# Patient Record
Sex: FEMALE | Race: WHITE | Employment: UNEMPLOYED | URBAN - METROPOLITAN AREA
[De-identification: names, ages, dates, MRNs, and addresses within clinical notes are randomized per-mention and may not be internally consistent; named-entity substitution may affect disease eponyms.]

---

## 2021-04-07 ENCOUNTER — HOSPITAL ENCOUNTER (INPATIENT)
Age: 46
LOS: 6 days | Discharge: HOME | DRG: 885 | End: 2021-04-13
Attending: EMERGENCY MEDICINE | Admitting: PSYCHIATRY & NEUROLOGY
Payer: MEDICARE

## 2021-04-07 DIAGNOSIS — R45.850 HOMICIDAL IDEATION: ICD-10-CM

## 2021-04-07 DIAGNOSIS — G89.4 CHRONIC PAIN SYNDROME: ICD-10-CM

## 2021-04-07 DIAGNOSIS — F10.20 ALCOHOL USE DISORDER, SEVERE, DEPENDENCE (HCC): ICD-10-CM

## 2021-04-07 DIAGNOSIS — F31.63 BIPOLAR DISORDER, CURR EPISODE MIXED, SEVERE, W/O PSYCHOTIC FEATURES (HCC): ICD-10-CM

## 2021-04-07 DIAGNOSIS — F10.920 ALCOHOLIC INTOXICATION WITHOUT COMPLICATION (HCC): Primary | ICD-10-CM

## 2021-04-07 DIAGNOSIS — F10.24: ICD-10-CM

## 2021-04-07 PROBLEM — F31.10 BIPOLAR DISEASE, MANIC (HCC): Status: ACTIVE | Noted: 2021-04-07

## 2021-04-07 LAB
AMPHET UR QL SCN: NEGATIVE
BARBITURATES UR QL SCN: NEGATIVE
BENZODIAZ UR QL: NEGATIVE
CANNABINOIDS UR QL SCN: POSITIVE
COCAINE UR QL SCN: NEGATIVE
COMMENT, HOLDF: NORMAL
DRUG SCRN COMMENT,DRGCM: ABNORMAL
ETHANOL SERPL-MCNC: 110 MG/DL
ETHANOL SERPL-MCNC: 311 MG/DL
FLUAV RNA SPEC QL NAA+PROBE: NOT DETECTED
FLUBV RNA SPEC QL NAA+PROBE: NOT DETECTED
HCG UR QL: NEGATIVE
METHADONE UR QL: NEGATIVE
OPIATES UR QL: NEGATIVE
PCP UR QL: NEGATIVE
SAMPLES BEING HELD,HOLD: NORMAL
SARS-COV-2, COV2: NOT DETECTED

## 2021-04-07 PROCEDURE — 82077 ASSAY SPEC XCP UR&BREATH IA: CPT

## 2021-04-07 PROCEDURE — 87636 SARSCOV2 & INF A&B AMP PRB: CPT

## 2021-04-07 PROCEDURE — 99285 EMERGENCY DEPT VISIT HI MDM: CPT

## 2021-04-07 PROCEDURE — 81025 URINE PREGNANCY TEST: CPT

## 2021-04-07 PROCEDURE — 65220000003 HC RM SEMIPRIVATE PSYCH

## 2021-04-07 PROCEDURE — 80307 DRUG TEST PRSMV CHEM ANLYZR: CPT

## 2021-04-07 PROCEDURE — 36415 COLL VENOUS BLD VENIPUNCTURE: CPT

## 2021-04-07 PROCEDURE — 74011250637 HC RX REV CODE- 250/637: Performed by: EMERGENCY MEDICINE

## 2021-04-07 RX ORDER — PHENOBARBITAL 32.4 MG/1
64.8 TABLET ORAL 4 TIMES DAILY
Status: COMPLETED | OUTPATIENT
Start: 2021-04-08 | End: 2021-04-08

## 2021-04-07 RX ORDER — ADHESIVE BANDAGE
30 BANDAGE TOPICAL DAILY PRN
Status: DISCONTINUED | OUTPATIENT
Start: 2021-04-07 | End: 2021-04-13 | Stop reason: HOSPADM

## 2021-04-07 RX ORDER — PHENOBARBITAL 32.4 MG/1
32.4 TABLET ORAL
Status: DISPENSED | OUTPATIENT
Start: 2021-04-08 | End: 2021-04-10

## 2021-04-07 RX ORDER — OLANZAPINE 5 MG/1
5 TABLET ORAL
Status: DISCONTINUED | OUTPATIENT
Start: 2021-04-07 | End: 2021-04-13 | Stop reason: HOSPADM

## 2021-04-07 RX ORDER — PHENOBARBITAL 32.4 MG/1
16.2 TABLET ORAL 2 TIMES DAILY
Status: COMPLETED | OUTPATIENT
Start: 2021-04-11 | End: 2021-04-11

## 2021-04-07 RX ORDER — OXYCODONE HYDROCHLORIDE 5 MG/1
5 TABLET ORAL
Status: COMPLETED | OUTPATIENT
Start: 2021-04-07 | End: 2021-04-07

## 2021-04-07 RX ORDER — PHENOBARBITAL 32.4 MG/1
32.4 TABLET ORAL 2 TIMES DAILY
Status: COMPLETED | OUTPATIENT
Start: 2021-04-10 | End: 2021-04-10

## 2021-04-07 RX ORDER — PHENOBARBITAL 32.4 MG/1
16.2 TABLET ORAL
Status: ACTIVE | OUTPATIENT
Start: 2021-04-10 | End: 2021-04-11

## 2021-04-07 RX ORDER — IBUPROFEN 200 MG
1 TABLET ORAL
Status: COMPLETED | OUTPATIENT
Start: 2021-04-07 | End: 2021-04-08

## 2021-04-07 RX ORDER — LORAZEPAM 2 MG/ML
1 INJECTION INTRAMUSCULAR
Status: DISCONTINUED | OUTPATIENT
Start: 2021-04-07 | End: 2021-04-13 | Stop reason: HOSPADM

## 2021-04-07 RX ORDER — OXYCODONE HYDROCHLORIDE 10 MG/1
7.5 TABLET ORAL
Status: ON HOLD | COMMUNITY
End: 2021-04-08

## 2021-04-07 RX ORDER — ACETAMINOPHEN 325 MG/1
650 TABLET ORAL
Status: DISCONTINUED | OUTPATIENT
Start: 2021-04-07 | End: 2021-04-09

## 2021-04-07 RX ORDER — LORAZEPAM 1 MG/1
1 TABLET ORAL
Status: COMPLETED | OUTPATIENT
Start: 2021-04-07 | End: 2021-04-07

## 2021-04-07 RX ORDER — PHENOBARBITAL 32.4 MG/1
32.4 TABLET ORAL 4 TIMES DAILY
Status: COMPLETED | OUTPATIENT
Start: 2021-04-08 | End: 2021-04-09

## 2021-04-07 RX ORDER — ZIPRASIDONE HYDROCHLORIDE 60 MG/1
60 CAPSULE ORAL 2 TIMES DAILY WITH MEALS
COMMUNITY
End: 2021-04-13

## 2021-04-07 RX ORDER — THERA TABS 400 MCG
1 TAB ORAL DAILY
Status: DISCONTINUED | OUTPATIENT
Start: 2021-04-08 | End: 2021-04-13 | Stop reason: HOSPADM

## 2021-04-07 RX ORDER — HYDROXYZINE 25 MG/1
50 TABLET, FILM COATED ORAL
Status: DISCONTINUED | OUTPATIENT
Start: 2021-04-07 | End: 2021-04-10

## 2021-04-07 RX ORDER — IBUPROFEN 400 MG/1
400 TABLET ORAL
Status: DISCONTINUED | OUTPATIENT
Start: 2021-04-07 | End: 2021-04-13 | Stop reason: HOSPADM

## 2021-04-07 RX ORDER — ACETAMINOPHEN 500 MG
1000 TABLET ORAL
Status: DISCONTINUED | OUTPATIENT
Start: 2021-04-07 | End: 2021-04-07

## 2021-04-07 RX ORDER — IBUPROFEN 200 MG
1 TABLET ORAL DAILY
Status: DISCONTINUED | OUTPATIENT
Start: 2021-04-08 | End: 2021-04-13 | Stop reason: HOSPADM

## 2021-04-07 RX ORDER — DIPHENHYDRAMINE HYDROCHLORIDE 50 MG/ML
50 INJECTION, SOLUTION INTRAMUSCULAR; INTRAVENOUS
Status: DISCONTINUED | OUTPATIENT
Start: 2021-04-07 | End: 2021-04-13 | Stop reason: HOSPADM

## 2021-04-07 RX ORDER — IBUPROFEN 400 MG/1
800 TABLET ORAL
Status: DISCONTINUED | OUTPATIENT
Start: 2021-04-07 | End: 2021-04-07

## 2021-04-07 RX ORDER — HALOPERIDOL 5 MG/ML
5 INJECTION INTRAMUSCULAR
Status: DISCONTINUED | OUTPATIENT
Start: 2021-04-07 | End: 2021-04-13 | Stop reason: HOSPADM

## 2021-04-07 RX ORDER — ZOLPIDEM TARTRATE 10 MG/1
10 TABLET ORAL
COMMUNITY
End: 2021-04-13

## 2021-04-07 RX ORDER — BENZTROPINE MESYLATE 1 MG/1
1 TABLET ORAL
Status: DISCONTINUED | OUTPATIENT
Start: 2021-04-07 | End: 2021-04-13 | Stop reason: HOSPADM

## 2021-04-07 RX ORDER — PHENOBARBITAL 32.4 MG/1
64.8 TABLET ORAL
Status: DISPENSED | OUTPATIENT
Start: 2021-04-07 | End: 2021-04-08

## 2021-04-07 RX ORDER — LEVETIRACETAM 500 MG/1
500 TABLET ORAL 2 TIMES DAILY
Status: DISCONTINUED | OUTPATIENT
Start: 2021-04-08 | End: 2021-04-08

## 2021-04-07 RX ORDER — FOLIC ACID 1 MG/1
1 TABLET ORAL DAILY
Status: DISCONTINUED | OUTPATIENT
Start: 2021-04-08 | End: 2021-04-13 | Stop reason: HOSPADM

## 2021-04-07 RX ORDER — PAROXETINE 30 MG/1
60 TABLET, FILM COATED ORAL DAILY
Status: ON HOLD | COMMUNITY
End: 2021-04-08

## 2021-04-07 RX ORDER — LANOLIN ALCOHOL/MO/W.PET/CERES
100 CREAM (GRAM) TOPICAL DAILY
Status: DISCONTINUED | OUTPATIENT
Start: 2021-04-08 | End: 2021-04-13 | Stop reason: HOSPADM

## 2021-04-07 RX ORDER — TRAZODONE HYDROCHLORIDE 50 MG/1
50 TABLET ORAL
Status: DISCONTINUED | OUTPATIENT
Start: 2021-04-07 | End: 2021-04-13 | Stop reason: HOSPADM

## 2021-04-07 RX ADMIN — LORAZEPAM 1 MG: 1 TABLET ORAL at 18:00

## 2021-04-07 RX ADMIN — OXYCODONE HYDROCHLORIDE 5 MG: 5 TABLET ORAL at 15:50
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
## 2021-04-07 NOTE — ED NOTES
Per ED tech, pt refused COVID swab. Pt stated she has received her COVID vacc and she does not need to be swabbed. Pt given meal tray and hospital cup of water.
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
## 2021-04-07 NOTE — ED NOTES
TRANSFER - OUT REPORT:    Verbal report given to Rosa Phalen, RN(name) on Cat  being transferred to U(unit) for routine progression of care       Report consisted of patients Situation, Background, Assessment and   Recommendations(SBAR). Information from the following report(s) SBAR, ED Summary, [facility] and Recent Results was reviewed with the receiving nurse. Lines:       Opportunity for questions and clarification was provided.       Patient transported with:   security to 3rd floor

## 2021-04-08 PROBLEM — G89.29 CHRONIC PAIN: Status: ACTIVE | Noted: 2021-04-08

## 2021-04-08 PROBLEM — F31.63 BIPOLAR DISORDER, CURR EPISODE MIXED, SEVERE, W/O PSYCHOTIC FEATURES (HCC): Status: ACTIVE | Noted: 2021-04-08

## 2021-04-08 PROBLEM — F10.20 ALCOHOL USE DISORDER, SEVERE, DEPENDENCE (HCC): Status: ACTIVE | Noted: 2021-04-08

## 2021-04-08 PROBLEM — F31.10 BIPOLAR DISEASE, MANIC (HCC): Status: RESOLVED | Noted: 2021-04-07 | Resolved: 2021-04-08

## 2021-04-08 PROBLEM — F10.24: Status: ACTIVE | Noted: 2021-04-08

## 2021-04-08 PROCEDURE — 99223 1ST HOSP IP/OBS HIGH 75: CPT | Performed by: PSYCHIATRY & NEUROLOGY

## 2021-04-08 PROCEDURE — 65220000003 HC RM SEMIPRIVATE PSYCH

## 2021-04-08 PROCEDURE — 74011250637 HC RX REV CODE- 250/637: Performed by: NURSE PRACTITIONER

## 2021-04-08 PROCEDURE — 74011250637 HC RX REV CODE- 250/637: Performed by: PSYCHIATRY & NEUROLOGY

## 2021-04-08 RX ORDER — PANTOPRAZOLE SODIUM 40 MG/1
40 TABLET, DELAYED RELEASE ORAL
Status: DISCONTINUED | OUTPATIENT
Start: 2021-04-08 | End: 2021-04-13 | Stop reason: HOSPADM

## 2021-04-08 RX ORDER — OXYCODONE HYDROCHLORIDE 5 MG/1
7.5 TABLET ORAL
COMMUNITY

## 2021-04-08 RX ORDER — PANTOPRAZOLE SODIUM 40 MG/1
40 TABLET, DELAYED RELEASE ORAL 2 TIMES DAILY
COMMUNITY

## 2021-04-08 RX ORDER — ALBUTEROL SULFATE 90 UG/1
2 AEROSOL, METERED RESPIRATORY (INHALATION)
Status: DISCONTINUED | OUTPATIENT
Start: 2021-04-08 | End: 2021-04-13 | Stop reason: HOSPADM

## 2021-04-08 RX ORDER — PREGABALIN 75 MG/1
75 CAPSULE ORAL 2 TIMES DAILY
Status: DISCONTINUED | OUTPATIENT
Start: 2021-04-08 | End: 2021-04-13 | Stop reason: HOSPADM

## 2021-04-08 RX ORDER — SUCRALFATE 1 G/1
1 TABLET ORAL
COMMUNITY

## 2021-04-08 RX ORDER — LANOLIN ALCOHOL/MO/W.PET/CERES
650 CREAM (GRAM) TOPICAL
Status: DISCONTINUED | OUTPATIENT
Start: 2021-04-09 | End: 2021-04-13 | Stop reason: HOSPADM

## 2021-04-08 RX ORDER — ONDANSETRON 4 MG/1
4 TABLET, ORALLY DISINTEGRATING ORAL
Status: DISCONTINUED | OUTPATIENT
Start: 2021-04-08 | End: 2021-04-13 | Stop reason: HOSPADM

## 2021-04-08 RX ORDER — PAROXETINE HYDROCHLORIDE 40 MG/1
40 TABLET, FILM COATED ORAL DAILY
Status: ON HOLD | COMMUNITY
End: 2021-04-08

## 2021-04-08 RX ORDER — ALBUTEROL SULFATE 90 UG/1
2 AEROSOL, METERED RESPIRATORY (INHALATION)
COMMUNITY

## 2021-04-08 RX ORDER — METOPROLOL TARTRATE 100 MG/1
100 TABLET ORAL EVERY 12 HOURS
COMMUNITY
End: 2021-04-13

## 2021-04-08 RX ORDER — PAROXETINE HYDROCHLORIDE 40 MG/1
40 TABLET, FILM COATED ORAL DAILY
COMMUNITY
End: 2021-04-13

## 2021-04-08 RX ORDER — LANOLIN ALCOHOL/MO/W.PET/CERES
650 CREAM (GRAM) TOPICAL
COMMUNITY

## 2021-04-08 RX ORDER — ONDANSETRON 2 MG/ML
4 INJECTION INTRAMUSCULAR; INTRAVENOUS
Status: DISCONTINUED | OUTPATIENT
Start: 2021-04-08 | End: 2021-04-13 | Stop reason: HOSPADM

## 2021-04-08 RX ORDER — CLONIDINE HYDROCHLORIDE 0.1 MG/1
0.1 TABLET ORAL
Status: DISCONTINUED | OUTPATIENT
Start: 2021-04-08 | End: 2021-04-13 | Stop reason: HOSPADM

## 2021-04-08 RX ORDER — SUCRALFATE 1 G/1
1 TABLET ORAL
Status: DISCONTINUED | OUTPATIENT
Start: 2021-04-08 | End: 2021-04-13 | Stop reason: HOSPADM

## 2021-04-08 RX ADMIN — THERA TABS 1 TABLET: TAB at 08:10

## 2021-04-08 RX ADMIN — PHENOBARBITAL 64.8 MG: 32.4 TABLET ORAL at 00:44

## 2021-04-08 RX ADMIN — PREGABALIN 75 MG: 75 CAPSULE ORAL at 12:49

## 2021-04-08 RX ADMIN — ZIPRASIDONE HYDROCHLORIDE 60 MG: 20 CAPSULE ORAL at 08:09

## 2021-04-08 RX ADMIN — PREGABALIN 75 MG: 75 CAPSULE ORAL at 16:54

## 2021-04-08 RX ADMIN — HYDROXYZINE HYDROCHLORIDE 50 MG: 25 TABLET, FILM COATED ORAL at 21:33

## 2021-04-08 RX ADMIN — TRAZODONE HYDROCHLORIDE 50 MG: 50 TABLET ORAL at 21:33

## 2021-04-08 RX ADMIN — HYDROXYZINE HYDROCHLORIDE 50 MG: 25 TABLET, FILM COATED ORAL at 00:44

## 2021-04-08 RX ADMIN — PHENOBARBITAL 64.8 MG: 32.4 TABLET ORAL at 12:50

## 2021-04-08 RX ADMIN — SUCRALFATE 1 G: 1 TABLET ORAL at 17:11

## 2021-04-08 RX ADMIN — PHENOBARBITAL 32.4 MG: 32.4 TABLET ORAL at 21:33

## 2021-04-08 RX ADMIN — PANTOPRAZOLE SODIUM 40 MG: 40 TABLET, DELAYED RELEASE ORAL at 16:55

## 2021-04-08 RX ADMIN — ZIPRASIDONE HYDROCHLORIDE 60 MG: 20 CAPSULE ORAL at 16:53

## 2021-04-08 RX ADMIN — Medication 100 MG: at 08:10

## 2021-04-08 RX ADMIN — PHENOBARBITAL 64.8 MG: 32.4 TABLET ORAL at 07:19

## 2021-04-08 RX ADMIN — PHENOBARBITAL 64.8 MG: 32.4 TABLET ORAL at 09:28

## 2021-04-08 RX ADMIN — ACETAMINOPHEN 650 MG: 325 TABLET ORAL at 00:44

## 2021-04-08 RX ADMIN — ONDANSETRON 4 MG: 4 TABLET, ORALLY DISINTEGRATING ORAL at 12:49

## 2021-04-08 RX ADMIN — ACETAMINOPHEN 650 MG: 325 TABLET ORAL at 21:33

## 2021-04-08 RX ADMIN — PHENOBARBITAL 64.8 MG: 32.4 TABLET ORAL at 16:56

## 2021-04-08 RX ADMIN — LEVETIRACETAM 500 MG: 500 TABLET ORAL at 08:10

## 2021-04-08 RX ADMIN — CLONIDINE HYDROCHLORIDE 0.1 MG: 0.1 TABLET ORAL at 07:19

## 2021-04-08 RX ADMIN — ONDANSETRON 4 MG: 4 TABLET, ORALLY DISINTEGRATING ORAL at 07:19

## 2021-04-08 RX ADMIN — FOLIC ACID 1 MG: 1 TABLET ORAL at 08:11

## 2021-04-08 RX ADMIN — PHENOBARBITAL 64.8 MG: 32.4 TABLET ORAL at 18:46

## 2021-04-08 RX ADMIN — ACETAMINOPHEN 650 MG: 325 TABLET ORAL at 07:19

## 2021-04-08 RX ADMIN — ACETAMINOPHEN 650 MG: 325 TABLET ORAL at 12:49

## 2021-04-08 RX ADMIN — ONDANSETRON 4 MG: 4 TABLET, ORALLY DISINTEGRATING ORAL at 00:43

## 2021-04-08 RX ADMIN — SUCRALFATE 1 G: 1 TABLET ORAL at 21:33

## 2021-04-08 RX ADMIN — ONDANSETRON 4 MG: 4 TABLET, ORALLY DISINTEGRATING ORAL at 21:33

## 2021-04-08 NOTE — GROUP NOTE
NILESH  GROUP DOCUMENTATION INDIVIDUAL Group Therapy Note Date: 4/8/2021 Group Start Time: 1400 Group End Time: 1500 Group Topic: Recreational/Music Therapy [address] ACUTE BEHAV [facility], [facility] GROUP DOCUMENTATION GROUP Group Therapy Note Attendees: 8 Attendance: Did not attend Patient's Goal: Interventions/techniquesMira Horan

## 2021-04-08 NOTE — PROGRESS NOTES
Behavioral Services  Medicare Certification Upon Admission    I certify that this patient's inpatient psychiatric hospital admission is medically necessary for:    [x] (1) Treatment which could reasonably be expected to improve this patient's condition,       [x] (2) Or for diagnostic study;     AND     [x](2) The inpatient psychiatric services are provided while the individual is under the care of a physician and are included in the individualized plan of care.     Estimated length of stay/service 5-7 days    Plan for post-hospital care rehab vs CSU    Electronically signed by Ida Funez MD on 4/8/2021 at 9:33 AM

## 2021-04-08 NOTE — BH NOTES
SW received a call from patient's  who requested he be notified of patient's discharge. He reports he is willing to participate in her treatment (I.e. family meeting). He may be reached at [phone] (cell) or [phone] (house). He expressed concern that patient will be very upset with him, will present well in the hospital, but will be angry with him if she returns home. MICHELLE acknowledged receipt of information.
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
## 2021-04-08 NOTE — PROGRESS NOTES
Problem: Chemical Dependency (Adult/Pediatric)  Goal: *STG: Remains safe in hospital  Outcome: Progressing Towards Goal  Goal: *STG: Vital signs within defined limits  Outcome: Progressing Towards Goal     Problem: Chemical Dependency (Adult/Pediatric)  Goal: *STG: Seeks staff when symptoms of withdrawal increase  Outcome: Progressing Towards Goal

## 2021-04-08 NOTE — BH NOTES
GROUP THERAPY PROGRESS NOTE    Patient did not participate in Process group.      Angel Pineda LPC [facility]

## 2021-04-08 NOTE — ED NOTES
Informed by Anita Washburn that pt can go up to [facility] once bed is assigned ().  Will continue to monitor

## 2021-04-08 NOTE — PROGRESS NOTES
Laboratory monitoring for mood stabilizer and antipsychotics:    Recommended baseline monitoring has been completed based on this patient's current medication regimen. The patient is currently taking the following medication(s):   Current Facility-Administered Medications   Medication Dose Route Frequency    pregabalin (LYRICA) capsule 75 mg  75 mg Oral BID    sucralfate (CARAFATE) tablet 1 g  1 g Oral AC&HS    ferrous sulfate tablet 650 mg  650 mg Oral ACB    pantoprazole (PROTONIX) tablet 40 mg  40 mg Oral ACB&D    PHENobarbitaL (LUMINAL) tablet 32.4 mg  32.4 mg Oral QID    Followed by   Sabrina Meeker ON 4/10/2021] PHENobarbitaL (LUMINAL) tablet 32.4 mg  32.4 mg Oral BID    Followed by   Sabrina Meeker ON 4/11/2021] PHENobarbitaL (LUMINAL) tablet 16.2 mg  16.2 mg Oral BID    thiamine HCL (B-1) tablet 100 mg  100 mg Oral DAILY    folic acid (FOLVITE) tablet 1 mg  1 mg Oral DAILY    therapeutic multivitamin (THERAGRAN) tablet 1 Tab  1 Tab Oral DAILY    nicotine (NICODERM CQ) 21 mg/24 hr patch 1 Patch  1 Patch TransDERmal DAILY    ziprasidone (GEODON) capsule 60 mg  60 mg Oral BID WITH MEALS       Height, Weight, BMI Estimation  Estimated body mass index is 20.36 kg/m² as calculated from the following:    Height as of this encounter: 170.2 cm (67\"). Weight as of this encounter: 59 kg (130 lb). Renal Function, Hepatic Function and Chemistry  Estimated Creatinine Clearance: 96.5 mL/min (by C-G formula based on SCr of 0.59 mg/dL).     Lab Results   Component Value Date/Time    Sodium 142 04/09/2021 04:51 AM    Potassium 3.2 (L) 04/09/2021 04:51 AM    Chloride 104 04/09/2021 04:51 AM    CO2 28 04/09/2021 04:51 AM    Anion gap 10 04/09/2021 04:51 AM    Glucose 89 04/09/2021 04:51 AM    BUN 7 04/09/2021 04:51 AM    Creatinine 0.59 04/09/2021 04:51 AM    BUN/Creatinine ratio 12 04/09/2021 04:51 AM    GFR est AA >60 04/09/2021 04:51 AM    GFR est non-AA >60 04/09/2021 04:51 AM    Calcium 8.4 (L) 04/09/2021 04:51 AM ALT (SGPT) 16 04/09/2021 04:51 AM    Alk. phosphatase 65 04/09/2021 04:51 AM    Protein, total 7.0 04/09/2021 04:51 AM    Albumin 3.2 (L) 04/09/2021 04:51 AM    Globulin 3.8 04/09/2021 04:51 AM    A-G Ratio 0.8 (L) 04/09/2021 04:51 AM    Bilirubin, total 0.4 04/09/2021 04:51 AM       Lab Results   Component Value Date/Time    Glucose 89 04/09/2021 04:51 AM       Lab Results   Component Value Date/Time    Hemoglobin A1c 4.7 04/09/2021 04:51 AM       Hematology  Lab Results   Component Value Date/Time    WBC 4.3 04/09/2021 04:51 AM    HGB 9.4 (L) 04/09/2021 04:51 AM    HCT 30.7 (L) 04/09/2021 04:51 AM    PLATELET 252 48/75/1606 04:51 AM    MCV 76.9 (L) 04/09/2021 04:51 AM       Lipids  Lab Results   Component Value Date/Time    Cholesterol, total 150 04/09/2021 04:51 AM    HDL Cholesterol 80 04/09/2021 04:51 AM    LDL, calculated 55.8 04/09/2021 04:51 AM    Triglyceride 71 04/09/2021 04:51 AM    CHOL/HDL Ratio 1.9 04/09/2021 04:51 AM       Thyroid Function    Lab Results   Component Value Date/Time    TSH 0.66 04/09/2021 04:51 AM     Vitals  Visit Vitals  /76   Pulse 98   Temp 98.1 °F (36.7 °C)   Resp 16   Ht 170.2 cm (67\")   Wt 59 kg (130 lb)   SpO2 94%   Breastfeeding No   BMI 20.36 kg/m²       Pregnancy Test  Lab Results   Component Value Date/Time    Pregnancy test,urine (POC) Negative 04/07/2021 03:41 PM       Thank you,  Yamila Hernandez, Pharm. D.  [phone]

## 2021-04-08 NOTE — GROUP NOTE
NILESH  GROUP DOCUMENTATION INDIVIDUAL Group Therapy Note Date: 4/8/2021 Group Start Time: 1000 Group End Time: 1100 Group Topic: Topic Group [address] ACUTE BEHAV [facility], [address] [facility] GROUP DOCUMENTATION GROUP Group Therapy Note Attendees: 3 Attendance: Did not attend Patient's Goal: Interventions/techniques: 
Stephanie Franklin
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
## 2021-04-08 NOTE — H&P
INITIAL PSYCHIATRIC EVALUATION            IDENTIFICATION:    Patient Name  Beverley TechLoaner Isamar   Date of Birth 1975   [facility] [id number]   Medical Record Number  [id number]      Age  39 y.o. PCP Other, MD Grace   Admit date:  4/7/2021    Room Number  829/43  @ [facility]   Date of Service  4/8/2021            HISTORY         REASON FOR HOSPITALIZATION:  CC: \"HI / EtOH detoxI\". Pt admitted under a temporary [facility] order (TDO) for severe psychosis proving to be an imminent danger to self and others and an inability to care for self. HISTORY OF PRESENT ILLNESS:    The patient, Beverley TechLoaner Isamar, is a 39 y.o. WHITE female with a past psychiatric history significant for bipolar disorder and etoh use disorder, who presents at this time with complaints of (and/or evidence of) the following emotional symptoms: psychotic behavior and servando. Additional symptomatology include homicidal ideation. The above symptoms have been present for 2+ weeks. These symptoms are of moderate to high severity. These symptoms are constant in nature. The patient's condition has been precipitated by psychosocial stressors. Patient's condition made worse by continued psychoactive drug use and alcohol use as well as treatment noncompliance. UDS: +THC; BAL=311 at admission. Per documentation, patient has been acting more erratically at home, and recently threatening to kill her  by cutting his throat. She has been using combinations of medicinal marijuana and pain medication as well as up to a liter of alcohol daily. The patient is an unreliable historian. The patient corroborates the above narrative. The patient contracts for safety on the unit and gives consent for the team to contact collateral. The patient is amenable to initiating treatment while on the unit. The patient reports that she is feeling physically debilitated and acknowledges alcohol withdrawal symptoms.  Patient denies active thoughts of self harm. Education provided regarding the deleterious effects of combining psychoactive drugs with alcohol and painkillers, patient voiced understanding. ALLERGIES:   Allergies   Allergen Reactions    Prednisone Shortness of Breath    Venom-Honey Bee Swelling      MEDICATIONS PRIOR TO ADMISSION:   Medications Prior to Admission   Medication Sig    oxyCODONE IR (ROXICODONE) 5 mg immediate release tablet Take 7.5 mg by mouth every six (6) hours as needed for Pain.  ferrous sulfate 325 mg (65 mg iron) tablet Take 650 mg by mouth Daily (before breakfast).  pantoprazole (PROTONIX) 40 mg tablet Take 40 mg by mouth two (2) times a day.  albuterol (PROVENTIL HFA, VENTOLIN HFA, PROAIR HFA) 90 mcg/actuation inhaler Take 2 Puffs by inhalation every four (4) hours as needed for Wheezing or Shortness of Breath.  sucralfate (CARAFATE) 1 gram tablet Take 1 g by mouth Before breakfast, lunch, dinner and at bedtime.  PARoxetine (PAXIL) 40 mg tablet Take 40 mg by mouth daily. Indications: bipolar depression    ziprasidone hcl (Geodon) 60 mg capsule Take 60 mg by mouth two (2) times daily (with meals).  zolpidem (Ambien) 10 mg tablet Take 10 mg by mouth nightly as needed for Sleep.  metoprolol tartrate (LOPRESSOR) 100 mg IR tablet Take 100 mg by mouth every twelve (12) hours. PAST MEDICAL HISTORY:   Past Medical History:   Diagnosis Date    Bipolar 1 disorder ([facility] [address]     Chronic back pain     Cyst near tailbone     Scoliosis    History reviewed. No pertinent surgical history. SOCIAL HISTORY:  The patient is currently on disability; the patient is a smoker, and smokes up to 1 ppd; the patient's marital status is ; the patient has adult children; the patient reports the highest level of education achieved is high school. FAMILY HISTORY: History reviewed, pertinent family history as below:   History reviewed. No pertinent family history.     REVIEW OF SYSTEMS:   Pertinent items are noted in the History of Present Illness. All other Systems reviewed and are considered negative. MENTAL STATUS EXAM & VITALS     MENTAL STATUS EXAM (MSE):    MSE FINDINGS ARE WITHIN NORMAL LIMITS (WNL) UNLESS OTHERWISE STATED BELOW. ( ALL OF THE BELOW CATEGORIES OF THE MSE HAVE BEEN REVIEWED (reviewed 4/8/2021) AND UPDATED AS DEEMED APPROPRIATE )  General Presentation age appropriate and disheveled, cooperative and evasive   Orientation oriented to time, place and person   Vital Signs  See below (reviewed 4/8/2021); Vital Signs (BP, Pulse, & Temp) are within normal limits if not listed below.    Gait and Station Stable/steady, no ataxia   Musculoskeletal System No extrapyramidal symptoms (EPS); no abnormal muscular movements or Tardive Dyskinesia (TD); muscle strength and tone are within normal limits   Language No aphasia or dysarthria   Speech:  normal volume and non-pressured   Thought Processes coherent; slow rate of thoughts; fair abstract reasoning/computation   Thought Associations blocked    Thought Content preoccupations   Suicidal Ideations contracts for safety   Homicidal Ideations none   Mood:  depressed and anhedonia   Affect:  constricted and mood-congruent   Memory recent  intact   Memory remote:  intact   Concentration/Attention:  intact   Fund of Knowledge average   Insight:  limited   Reliability poor   Judgment:  poor          VITALS:     Patient Vitals for the past 24 hrs:   Temp Pulse Resp BP SpO2   04/08/21 1230  (!) 109  (!) 135/91    04/08/21 0949  70  126/70    04/08/21 0713 97.8 °F (36.6 °C) (!) 126 20 (!) 159/88 100 %   04/08/21 0240  (!) 108  136/78    04/07/21 2205 97.8 °F (36.6 °C) 100 16 (!) 148/86 97 %   04/07/21 1929 98.2 °F (36.8 °C) 98 16 132/76 99 %   04/07/21 1627 98.1 °F (36.7 °C) 94 16 124/76 98 %     Wt Readings from Last 3 Encounters:   04/07/21 59 kg (130 lb)     Temp Readings from Last 3 Encounters:   04/08/21 97.8 °F (36.6 °C)     BP Readings from Last 3 Encounters:   04/08/21 (!) 135/91     Pulse Readings from Last 3 Encounters:   04/08/21 (!) 109            DATA     LABORATORY DATA:  Labs Reviewed   ETHYL ALCOHOL - Abnormal; Notable for the following components:       Result Value    ALCOHOL(ETHYL),SERUM 311 (*)     All other components within normal limits   DRUG SCREEN, URINE - Abnormal; Notable for the following components:    THC (TH-CANNABINOL) Positive (*)     All other components within normal limits   ETHYL ALCOHOL - Abnormal; Notable for the following components:    ALCOHOL(ETHYL),SERUM 110 (*)     All other components within normal limits   COVID-19 WITH INFLUENZA A/B   SAMPLES BEING HELD   HCG URINE, QL. - POC     Admission on 04/07/2021   Component Date Value Ref Range Status    SARS-CoV-2 04/07/2021 Not detected  NOTD   Final    Influenza A by PCR 04/07/2021 Not detected  NOTD   Final    Influenza B by PCR 04/07/2021 Not detected  NOTD   Final    ALCOHOL(ETHYL),SERUM 04/07/2021 311* <10 MG/DL Final    AMPHETAMINES 04/07/2021 Negative  NEG   Final    BARBITURATES 04/07/2021 Negative  NEG   Final    BENZODIAZEPINES 04/07/2021 Negative  NEG   Final    COCAINE 04/07/2021 Negative  NEG   Final    METHADONE 04/07/2021 Negative  NEG   Final    OPIATES 04/07/2021 Negative  NEG   Final    PCP(PHENCYCLIDINE) 04/07/2021 Negative  NEG   Final    THC (TH-CANNABINOL) 04/07/2021 Positive* NEG   Final    Drug screen comment 04/07/2021 (NOTE)   Final    SAMPLES BEING HELD 04/07/2021 SST,PST,BLUE,LAV   Final    COMMENT 04/07/2021 Add-on orders for these samples will be processed based on acceptable specimen integrity and analyte stability, which may vary by analyte. Final    ALCOHOL(ETHYL),SERUM 04/07/2021 110* <10 MG/DL Final    Pregnancy test,urine (POC) 04/07/2021 Negative  NEG   Final        RADIOLOGY REPORTS:  No results found for this or any previous visit. No results found.            MEDICATIONS       ALL MEDICATIONS  Current Facility-Administered Medications   Medication Dose Route Frequency    ondansetron (ZOFRAN ODT) tablet 4 mg  4 mg Oral Q6H PRN    ondansetron (ZOFRAN) injection 4 mg  4 mg IntraMUSCular Q8H PRN    cloNIDine HCL (CATAPRES) tablet 0.1 mg  0.1 mg Oral QID PRN    pregabalin (LYRICA) capsule 75 mg  75 mg Oral BID    sucralfate (CARAFATE) tablet 1 g  1 g Oral AC&HS    [START ON 4/9/2021] ferrous sulfate tablet 650 mg  650 mg Oral ACB    albuterol (PROVENTIL HFA, VENTOLIN HFA, PROAIR HFA) inhaler 2 Puff  2 Puff Inhalation Q4H PRN    PHENobarbitaL (LUMINAL) tablet 64.8 mg  64.8 mg Oral QID    Followed by   [facility] PHENobarbitaL (LUMINAL) tablet 32.4 mg  32.4 mg Oral QID    Followed by   Silvio Seal ON 4/10/2021] PHENobarbitaL (LUMINAL) tablet 32.4 mg  32.4 mg Oral BID    Followed by   Silvio Seal ON 4/11/2021] PHENobarbitaL (LUMINAL) tablet 16.2 mg  16.2 mg Oral BID    PHENobarbitaL (LUMINAL) tablet 64.8 mg  64.8 mg Oral Q6H PRN    Followed by   [facility] PHENobarbitaL (LUMINAL) tablet 32.4 mg  32.4 mg Oral Q6H PRN    Followed by   Silvio Seal ON 4/10/2021] PHENobarbitaL (LUMINAL) tablet 16.2 mg  16.2 mg Oral Q6H PRN    thiamine HCL (B-1) tablet 100 mg  100 mg Oral DAILY    folic acid (FOLVITE) tablet 1 mg  1 mg Oral DAILY    therapeutic multivitamin (THERAGRAN) tablet 1 Tab  1 Tab Oral DAILY    OLANZapine (ZyPREXA) tablet 5 mg  5 mg Oral Q6H PRN    haloperidol lactate (HALDOL) injection 5 mg  5 mg IntraMUSCular Q6H PRN    benztropine (COGENTIN) tablet 1 mg  1 mg Oral BID PRN    diphenhydrAMINE (BENADRYL) injection 50 mg  50 mg IntraMUSCular BID PRN    hydrOXYzine HCL (ATARAX) tablet 50 mg  50 mg Oral TID PRN    LORazepam (ATIVAN) injection 1 mg  1 mg IntraMUSCular Q4H PRN    traZODone (DESYREL) tablet 50 mg  50 mg Oral QHS PRN    acetaminophen (TYLENOL) tablet 650 mg  650 mg Oral Q4H PRN    magnesium hydroxide (MILK OF MAGNESIA) 400 mg/5 mL oral suspension 30 mL  30 mL Oral DAILY PRN    ibuprofen (MOTRIN) tablet 400 mg  400 mg Oral Q8H PRN    nicotine (NICODERM CQ) 21 mg/24 hr patch 1 Patch  1 Patch TransDERmal DAILY    ziprasidone (GEODON) capsule 60 mg  60 mg Oral BID WITH MEALS      SCHEDULED MEDICATIONS  Current Facility-Administered Medications   Medication Dose Route Frequency    pregabalin (LYRICA) capsule 75 mg  75 mg Oral BID    sucralfate (CARAFATE) tablet 1 g  1 g Oral AC&HS    [START ON 4/9/2021] ferrous sulfate tablet 650 mg  650 mg Oral ACB    PHENobarbitaL (LUMINAL) tablet 64.8 mg  64.8 mg Oral QID    Followed by   Leny Brito PHENobarbitaL (LUMINAL) tablet 32.4 mg  32.4 mg Oral QID    Followed by   Jovanna Thakur ON 4/10/2021] PHENobarbitaL (LUMINAL) tablet 32.4 mg  32.4 mg Oral BID    Followed by   Jovanna Thakur ON 4/11/2021] PHENobarbitaL (LUMINAL) tablet 16.2 mg  16.2 mg Oral BID    thiamine HCL (B-1) tablet 100 mg  100 mg Oral DAILY    folic acid (FOLVITE) tablet 1 mg  1 mg Oral DAILY    therapeutic multivitamin (THERAGRAN) tablet 1 Tab  1 Tab Oral DAILY    nicotine (NICODERM CQ) 21 mg/24 hr patch 1 Patch  1 Patch TransDERmal DAILY    ziprasidone (GEODON) capsule 60 mg  60 mg Oral BID WITH MEALS                ASSESSMENT & PLAN        The patient, Doug Holman, is a 39 y.o.  female who presents at this time for treatment of the following diagnoses:  Patient Active Hospital Problem List:   Bipolar disorder, curr episode mixed, severe, w/o psychotic features [facility] (4/8/2021)    Assessment: patient with ongoing mood lability, though much of her presentation can be attributed to polysubstance use, namely marijuana and alcohol which she has been using frequently. Treatment will focus on sobriety, stopping substances with significant psychiatric side effects, and encouraging sobriety.     Plan:  - DISCONTINUE medicinal marijuana due to psychosis  - RESTART Geodon 60 mg BID for bipolar disorder  - DISCONTINUE Paxil due to mood lability  - START Lyrica 75 mg BID for pain control  - Consider mood stabilizer  - DISCONTINUE Ambien due to polypharmacy  - CONTINUE EtOH withdrawal precautions  - CONTINUE Phenobarbital high dose taper (64 --> 16 mg)  - IGM therapy as tolerated  - Expand database / obtain collateral  - Dispo planning (rehab vs home)           A coordinated, multidisplinary treatment team (includes the nurse, unit pharmacist,  and writer) round was conducted for this initial evaluation with the patient present. The following regarding medications was addressed during rounds with patient: the risks and benefits of the proposed medication. The patient was given the opportunity to ask questions. Informed consent given to the use of the above medications. I will continue to adjust psychiatric and non-psychiatric medications (see above \"medication\" section and orders section for details) as deemed appropriate & based upon diagnoses and response to treatment. I have reviewed admission (and previous/old) labs and medical tests in the EHR and or transferring hospital documents. I will continue to order blood tests/labs and diagnostic tests as deemed appropriate and review results as they become available (see orders for details). I have reviewed old psychiatric and medical records available in the EHR. I Will order additional psychiatric records from other institutions to further elucidate the nature of patient's psychopathology and review once available. I will gather additional collateral information from friends, family and o/p treatment team to further elucidate the nature of patient's psychopathology and baselline level of psychiatric functioning. I certify that this patient's inpatient psychiatric hospital services are required for treatment that could reasonably be expected to improve the patient's condition, or for diagnostic study, and that the patient continues to need, on a daily basis, active treatment furnished directly by or requiring the supervision of inpatient psychiatric facility personnel.  In addition, the hospital records show that services furnished were intensive treatment services, admission or related services, or equivalent services.       ESTIMATED LENGTH OF STAY:  3-5 days       STRENGTHS:  Exercising self-direction/Resourceful, Interpersonal/supportive relationships (family, friends, peers) and Awareness of Substance abuse issues                                        SIGNED:    Danielle Cooney MD  4/8/2021

## 2021-04-08 NOTE — PROGRESS NOTES
[facility] Admission Pharmacy Medication Reconciliation     Information obtained from: Patient, [facility] pharmacy ([phone]), [address] ([phone]), [facility]  RxQuery data available1: No    Comments/recommendations: All medications verified as being filled recently except for metoprolol. Patient reports current dose as metoprolol 100 mg BID. No fills for metoprolol on record with [facility] pharmacy. Per [facility] pharmacy, patient last filled metoprolol succinate 50 mg 1 daily in 2019. Please re-start cautiously. Medication changes (since last review): Added  Albuterol inhaler  Ferrous sulfate  Pantoprazole  Sucralfate  Removed  Metoprolol succinate PO  Adjusted  Oxycodone tablet strength from 10 mg to 5 mg  Paroxetine tablet strength from 30 mg to 40 mg    [facility] [employer] () was accessed to determine fill history of any controlled medications. Regularly receives oxycodone 5 mg tablets every month - most recently filled #160 for 27 day supply on 3/31/21  Regularly receives zolpidem 10 mg tablets - most recently filled #30 for 30 day supply on 2/23/21  Receives medicinal marijuana per . Started receiving 3/4/21, most recently picked-up/filled on 4/3/21. Patient reports this is for her back pain. 1RxQuery pharmacy benefit data reflects medications filled and processed through the patient's insurance, however                this data does NOT capture whether the medication was picked up or is currently being taken by the patient. Total Time Spent: 30 minutes    Past Medical History/Disease States:  Past Medical History:   Diagnosis Date    Bipolar 1 disorder ([address]     Chronic back pain     Cyst near tailbone     Scoliosis          Patient allergies:    Allergies as of 04/07/2021 - Review Complete 04/07/2021   Allergen Reaction Noted    Prednisone Shortness of Breath 04/07/2021    Venom-honey bee Swelling 04/07/2021         Prior to Admission Medications   Prescriptions Last Dose Informant Patient Reported? Taking? PARoxetine (PAXIL) 40 mg tablet   Yes Yes   Sig: Take 40 mg by mouth daily. Indications: bipolar depression   albuterol (PROVENTIL HFA, VENTOLIN HFA, PROAIR HFA) 90 mcg/actuation inhaler   Yes Yes   Sig: Take 2 Puffs by inhalation every four (4) hours as needed for Wheezing or Shortness of Breath. ferrous sulfate 325 mg (65 mg iron) tablet   Yes Yes   Sig: Take 650 mg by mouth Daily (before breakfast). metoprolol tartrate (LOPRESSOR) 100 mg IR tablet Unknown at Unknown time  Yes No   Sig: Take 100 mg by mouth every twelve (12) hours. oxyCODONE IR (ROXICODONE) 5 mg immediate release tablet   Yes Yes   Sig: Take 7.5 mg by mouth every six (6) hours as needed for Pain.   pantoprazole (PROTONIX) 40 mg tablet   Yes Yes   Sig: Take 40 mg by mouth two (2) times a day. sucralfate (CARAFATE) 1 gram tablet   Yes Yes   Sig: Take 1 g by mouth Before breakfast, lunch, dinner and at bedtime. ziprasidone hcl (Geodon) 60 mg capsule 4/7/2021 at Unknown time  Yes Yes   Sig: Take 60 mg by mouth two (2) times daily (with meals). zolpidem (Ambien) 10 mg tablet   Yes Yes   Sig: Take 10 mg by mouth nightly as needed for Sleep.       Facility-Administered Medications: None        Thank you,  ROSANNA Ziegler [facility] Specialist, [address] [place] [address]  Desk: [phone]  Pharmacy: [phone])

## 2021-04-08 NOTE — BH NOTES
GROUP THERAPY PROGRESS NOTE    Patient did not participate in Substance abuse/Coping Skills group.      Peter Alberto LPC LSATP [facility]

## 2021-04-08 NOTE — PROGRESS NOTES
Problem: Chemical Dependency (Adult/Pediatric)  Goal: *STG: Participates in treatment plan  Outcome: Not Progressing Towards Goal  Goal: *STG: Remains safe in hospital  Outcome: Not Progressing Towards Goal  Goal: *STG: Seeks staff when symptoms of withdrawal increase  Outcome: Not Progressing Towards Goal  Goal: *STG: Complies with medication therapy  Outcome: Not Progressing Towards Goal

## 2021-04-09 LAB
ALBUMIN SERPL-MCNC: 3.2 G/DL (ref 3.5–5)
ALBUMIN/GLOB SERPL: 0.8 {RATIO} (ref 1.1–2.2)
ALP SERPL-CCNC: 65 U/L (ref 45–117)
ALT SERPL-CCNC: 16 U/L (ref 12–78)
ANION GAP SERPL CALC-SCNC: 10 MMOL/L (ref 5–15)
AST SERPL-CCNC: 23 U/L (ref 15–37)
BILIRUB SERPL-MCNC: 0.4 MG/DL (ref 0.2–1)
BUN SERPL-MCNC: 7 MG/DL (ref 6–20)
BUN/CREAT SERPL: 12 (ref 12–20)
CALCIUM SERPL-MCNC: 8.4 MG/DL (ref 8.5–10.1)
CHLORIDE SERPL-SCNC: 104 MMOL/L (ref 97–108)
CHOLEST SERPL-MCNC: 150 MG/DL
CO2 SERPL-SCNC: 28 MMOL/L (ref 21–32)
CREAT SERPL-MCNC: 0.59 MG/DL (ref 0.55–1.02)
ERYTHROCYTE [DISTWIDTH] IN BLOOD BY AUTOMATED COUNT: 23.9 % (ref 11.5–14.5)
EST. AVERAGE GLUCOSE BLD GHB EST-MCNC: 88 MG/DL
GGT SERPL-CCNC: 28 U/L (ref 5–55)
GLOBULIN SER CALC-MCNC: 3.8 G/DL (ref 2–4)
GLUCOSE SERPL-MCNC: 89 MG/DL (ref 65–100)
HBA1C MFR BLD: 4.7 % (ref 4–5.6)
HCT VFR BLD AUTO: 30.7 % (ref 35–47)
HDLC SERPL-MCNC: 80 MG/DL
HDLC SERPL: 1.9 {RATIO} (ref 0–5)
HGB BLD-MCNC: 9.4 G/DL (ref 11.5–16)
LDLC SERPL CALC-MCNC: 55.8 MG/DL (ref 0–100)
LIPID PROFILE,FLP: NORMAL
MCH RBC QN AUTO: 23.6 PG (ref 26–34)
MCHC RBC AUTO-ENTMCNC: 30.6 G/DL (ref 30–36.5)
MCV RBC AUTO: 76.9 FL (ref 80–99)
NRBC # BLD: 0 K/UL (ref 0–0.01)
NRBC BLD-RTO: 0 PER 100 WBC
PLATELET # BLD AUTO: 263 K/UL (ref 150–400)
PMV BLD AUTO: 9.3 FL (ref 8.9–12.9)
POTASSIUM SERPL-SCNC: 3.2 MMOL/L (ref 3.5–5.1)
PROT SERPL-MCNC: 7 G/DL (ref 6.4–8.2)
RBC # BLD AUTO: 3.99 M/UL (ref 3.8–5.2)
SODIUM SERPL-SCNC: 142 MMOL/L (ref 136–145)
TRIGL SERPL-MCNC: 71 MG/DL (ref ?–150)
TSH SERPL DL<=0.05 MIU/L-ACNC: 0.66 UIU/ML (ref 0.36–3.74)
VLDLC SERPL CALC-MCNC: 14.2 MG/DL
WBC # BLD AUTO: 4.3 K/UL (ref 3.6–11)

## 2021-04-09 PROCEDURE — 85027 COMPLETE CBC AUTOMATED: CPT

## 2021-04-09 PROCEDURE — 82977 ASSAY OF GGT: CPT

## 2021-04-09 PROCEDURE — 84443 ASSAY THYROID STIM HORMONE: CPT

## 2021-04-09 PROCEDURE — 80061 LIPID PANEL: CPT

## 2021-04-09 PROCEDURE — 99232 SBSQ HOSP IP/OBS MODERATE 35: CPT | Performed by: PSYCHIATRY & NEUROLOGY

## 2021-04-09 PROCEDURE — 74011250637 HC RX REV CODE- 250/637: Performed by: PSYCHIATRY & NEUROLOGY

## 2021-04-09 PROCEDURE — 80053 COMPREHEN METABOLIC PANEL: CPT

## 2021-04-09 PROCEDURE — 74011250637 HC RX REV CODE- 250/637: Performed by: NURSE PRACTITIONER

## 2021-04-09 PROCEDURE — 65220000003 HC RM SEMIPRIVATE PSYCH

## 2021-04-09 PROCEDURE — 83036 HEMOGLOBIN GLYCOSYLATED A1C: CPT

## 2021-04-09 PROCEDURE — 36415 COLL VENOUS BLD VENIPUNCTURE: CPT

## 2021-04-09 RX ORDER — OXYCODONE HYDROCHLORIDE 5 MG/1
5 TABLET ORAL
Status: DISCONTINUED | OUTPATIENT
Start: 2021-04-09 | End: 2021-04-13 | Stop reason: HOSPADM

## 2021-04-09 RX ORDER — ACETAMINOPHEN 325 MG/1
650 TABLET ORAL
Status: DISCONTINUED | OUTPATIENT
Start: 2021-04-09 | End: 2021-04-13 | Stop reason: HOSPADM

## 2021-04-09 RX ADMIN — OLANZAPINE 5 MG: 5 TABLET, FILM COATED ORAL at 10:17

## 2021-04-09 RX ADMIN — SUCRALFATE 1 G: 1 TABLET ORAL at 21:14

## 2021-04-09 RX ADMIN — FERROUS SULFATE TAB 325 MG (65 MG ELEMENTAL FE) 650 MG: 325 (65 FE) TAB at 05:44

## 2021-04-09 RX ADMIN — PANTOPRAZOLE SODIUM 40 MG: 40 TABLET, DELAYED RELEASE ORAL at 16:29

## 2021-04-09 RX ADMIN — HYDROXYZINE HYDROCHLORIDE 50 MG: 25 TABLET, FILM COATED ORAL at 08:25

## 2021-04-09 RX ADMIN — OXYCODONE HYDROCHLORIDE 5 MG: 5 TABLET ORAL at 17:26

## 2021-04-09 RX ADMIN — OXYCODONE HYDROCHLORIDE 5 MG: 5 TABLET ORAL at 23:19

## 2021-04-09 RX ADMIN — PHENOBARBITAL 32.4 MG: 32.4 TABLET ORAL at 16:29

## 2021-04-09 RX ADMIN — SUCRALFATE 1 G: 1 TABLET ORAL at 16:28

## 2021-04-09 RX ADMIN — SUCRALFATE 1 G: 1 TABLET ORAL at 11:08

## 2021-04-09 RX ADMIN — PHENOBARBITAL 32.4 MG: 32.4 TABLET ORAL at 08:25

## 2021-04-09 RX ADMIN — HYDROXYZINE HYDROCHLORIDE 50 MG: 25 TABLET, FILM COATED ORAL at 21:13

## 2021-04-09 RX ADMIN — ACETAMINOPHEN 650 MG: 325 TABLET ORAL at 15:50

## 2021-04-09 RX ADMIN — PREGABALIN 75 MG: 75 CAPSULE ORAL at 08:25

## 2021-04-09 RX ADMIN — FOLIC ACID 1 MG: 1 TABLET ORAL at 08:26

## 2021-04-09 RX ADMIN — TRAZODONE HYDROCHLORIDE 50 MG: 50 TABLET ORAL at 21:14

## 2021-04-09 RX ADMIN — ZIPRASIDONE HYDROCHLORIDE 60 MG: 20 CAPSULE ORAL at 08:25

## 2021-04-09 RX ADMIN — ZIPRASIDONE HYDROCHLORIDE 60 MG: 20 CAPSULE ORAL at 16:28

## 2021-04-09 RX ADMIN — ACETAMINOPHEN 650 MG: 325 TABLET ORAL at 10:17

## 2021-04-09 RX ADMIN — PANTOPRAZOLE SODIUM 40 MG: 40 TABLET, DELAYED RELEASE ORAL at 08:25

## 2021-04-09 RX ADMIN — THERA TABS 1 TABLET: TAB at 08:25

## 2021-04-09 RX ADMIN — ACETAMINOPHEN 650 MG: 325 TABLET ORAL at 21:14

## 2021-04-09 RX ADMIN — SUCRALFATE 1 G: 1 TABLET ORAL at 08:26

## 2021-04-09 RX ADMIN — PREGABALIN 75 MG: 75 CAPSULE ORAL at 16:28

## 2021-04-09 RX ADMIN — Medication 100 MG: at 08:25

## 2021-04-09 RX ADMIN — PHENOBARBITAL 32.4 MG: 32.4 TABLET ORAL at 11:08

## 2021-04-09 NOTE — BH NOTES
Behavioral Health Treatment Team Note     Patient goal(s) for today: Take medications as prescribed and attend groups  Treatment team focus/goals: Detox and dispo planning  Progress note:  Anxious, more organized thought process, less disheveled and some insight into substance use. Protective order placed by . Pt reports she does not want to go to rehab at this time. LOS:  2  Expected LOS: TBD    Financial concerns/prescription coverage:  VA Medicare Part A&B  TDO  Date of last family contact:  Permission to call sister 4/9     Family requesting physician contact today: No  Discharge plan: Sisters? Guns in the home: Denies weapons. Outpatient provider(s): To be linked.      Participating treatment team members: María Jacob MSW and Dr. Stephanie Christian MD

## 2021-04-09 NOTE — BH NOTES
GROUP THERAPY PROGRESS NOTE    Patient is participating in Discharge Planning Group. Group time: 30 minutes    Personal goal for participation: Process feelings related to discharge and/or feelings/goals for today. Goal orientation: Personal    Group therapy participation: active    Therapeutic interventions reviewed and discussed: Group discussion was focused on discharge plans and anxiety related to this. Group members discussed what they planned to do once discharge and discharge plans. Patients discussed their goals for today as well as the weekend and what they are working on with treatment team to get ready for discharge. Impression of participation: Sulema Mabry was present in group. She reports planning to attend groups to help with her discharge process. She was informed about ability to have a Zoom today or this weekend with friends or family is she wanted as visitors are not currently allowed. She denies any complaints. She reports being transferred this morning from acute to general and is getting to know the unit. She denies any issues that she would like to talk about. She did inquire about books available to check out. She was walked to the nurses station after group and shown some of the books. She chose 2 to take and read.     Pollo Roldanr [facility]

## 2021-04-09 NOTE — GROUP NOTE
NILESH  GROUP DOCUMENTATION INDIVIDUAL Group Therapy Note Date: 4/9/2021 Group Start Time: 1000 Group End Time: 1100 Group Topic: Topic Group [facility] 3 ACUTE BEHAV [facility] [place], [address] GROUP DOCUMENTATION GROUP Group Therapy Note Attendees: 2 Attendance: Attended Patient's Goal:  To learn about resilience Interventions/techniques: Supported-handout Life Bounces Us Around Sometimes Follows Directions: Followed directions Interactions: Interacted appropriately Mental Status: Calm Behavior/appearance: Attentive, Cooperative and Needed prompting Goals Achieved: Able to engage in interactions, Able to listen to others, Able to self-disclose, Discussed coping and Discussed self-esteem issues Additional Notes:   
 
Nina Duncan

## 2021-04-09 NOTE — PROGRESS NOTES
Patient rested 8 hours during the night. AM labs drawn and sent to the lab. No distress noted. Will continue to monitor.

## 2021-04-09 NOTE — GROUP NOTE
NILESH  GROUP DOCUMENTATION INDIVIDUAL Group Therapy Note Date: 4/9/2021 Group Start Time: 1400 Group End Time: 1500 Group Topic: Recreational/Music Therapy [address] [facility] 3 ACUTE BEHAV [facility] [place], [address] [facility] GROUP DOCUMENTATION GROUP Group Therapy Note Attendees: 6 Attendance: Did not attend Patient's Goal: Interventions/techniquesMira Horan

## 2021-04-09 NOTE — BH NOTES
[employer] conducted a virtual TDO Hearing this afternoon. The ending result of hearing, patient Voluntary.

## 2021-04-09 NOTE — PROGRESS NOTES
Problem: Chemical Dependency (Adult/Pediatric)  Goal: *STG: Participates in treatment plan  Outcome: Progressing Towards Goal  Goal: *STG: Remains safe in hospital  Outcome: Progressing Towards Goal  Goal: *STG: Seeks staff when symptoms of withdrawal increase  Outcome: Progressing Towards Goal  Goal: *STG: Complies with medication therapy  Outcome: Progressing Towards Goal  Goal: *STG: Attends activities and groups  Outcome: Progressing Towards Goal  Goal: *STG: Will identify negative impact of chemical dependency including the use of tobacco, alcohol, and other substances  Outcome: Progressing Towards Goal  Goal: *STG: Verbalizes abstinence as an achievable goal  Outcome: Progressing Towards Goal  Goal: *STG: Agrees to participate in outpatient after care program to support ongoing mental health  Outcome: Progressing Towards Goal  Goal: *STG: Able to indentify relapse triggers including interpersonal/social and familial factors  Outcome: Progressing Towards Goal  Goal: *STG: Identify lifestyle changes to support long term sobriety such as vocation, employment, education, and legal issues  Outcome: Progressing Towards Goal  Goal: *STG: Maintains appropriate nutrition and hydration  Outcome: Progressing Towards Goal  Goal: *STG: Vital signs within defined limits  Outcome: Progressing Towards Goal  Goal: *STG/LTG: Relapse prevention plan in place to include housing/aftercare, leisure activities, and spirituality  Outcome: Progressing Towards Goal  Goal: Interventions  Outcome: Progressing Towards Goal    Patient alert and oriented X4. She has been withdrawn to herself. Patient reported anxiety rated 10/10 and depression rated 3-4/10. She denied SI/HI and AVH at this time. Patient complained of generalized body pain rated 10/10 and received PRN Tylenol 650mg for pain at 2133. Compliant with PM medications. PRN Atarax 50mg for anxiety, PRN Zofran 4mg for nausea, and PRN Trazodone 50mg for sleep given at 2133.  MIKE score of 8 at this time. Vital signs stable and Q15 minute checks continue to maintain safety. RN will continue to monitor.

## 2021-04-09 NOTE — BH NOTES
PSYCHIATRIC PROGRESS NOTE         Patient Name  [address]   Date of Birth 1975   CSN [id number]   Medical Record Number  [id number]      Age  39 y.o. PCP Other, MD Grace   Admit date:  4/7/2021    Room Number  324/01  @ [facility]   Date of Service  4/9/2021         E & M PROGRESS NOTE:         HISTORY       CC:  \"psychosis / HI\"  HISTORY OF PRESENT ILLNESS/INTERVAL HISTORY:  (reviewed/updated 4/9/2021). per initial evaluation: The patient, Beverley Penn, is a 39 y.o. WHITE female with a past psychiatric history significant for bipolar disorder and etoh use disorder, who presents at this time with complaints of (and/or evidence of) the following emotional symptoms: psychotic behavior and servando. Additional symptomatology include homicidal ideation. The above symptoms have been present for 2+ weeks. These symptoms are of moderate to high severity. These symptoms are constant in nature. The patient's condition has been precipitated by psychosocial stressors. Patient's condition made worse by continued psychoactive drug use and alcohol use as well as treatment noncompliance. UDS: +THC; BAL=311 at admission.     Per documentation, patient has been acting more erratically at home, and recently threatening to kill her  by cutting his throat. She has been using combinations of medicinal marijuana and pain medication as well as up to a liter of alcohol daily.      The patient is an unreliable historian. The patient corroborates the above narrative. The patient contracts for safety on the unit and gives consent for the team to contact collateral. The patient is amenable to initiating treatment while on the unit. The patient reports that she is feeling physically debilitated and acknowledges alcohol withdrawal symptoms. Patient denies active thoughts of self harm.  Education provided regarding the deleterious effects of combining psychoactive drugs with alcohol and painkillers, patient voiced understanding. 4/09 - patient slept 8 hours overnight, she reports high anxiety (\"10/10\") and is reporting severe and chronic pain, which she states is not helped by NSAIDs (allergy) or Tylenol (ineffective). Patient denies SI/HI/AVH, declines rehab placement. Per nursing, patient was served with a protective order yesterday and cannot return home upon discharge. SW working on a safe disposition plan. Patient irritable but cooperative with treatment thus far. No significant withdrawal symptoms noted, CIWA scores have been 0-6 and typically due to anxiety. Patient tolerating phenobarbital taper, no tremor or diaphoresis observed. SIDE EFFECTS: (reviewed/updated 4/9/2021)  None reported or admitted to. ALLERGIES:(reviewed/updated 4/9/2021)  Allergies   Allergen Reactions    Prednisone Shortness of Breath    Venom-Honey Bee Swelling      MEDICATIONS PRIOR TO ADMISSION:(reviewed/updated 4/9/2021)  Medications Prior to Admission   Medication Sig    oxyCODONE IR (ROXICODONE) 5 mg immediate release tablet Take 7.5 mg by mouth every six (6) hours as needed for Pain.  ferrous sulfate 325 mg (65 mg iron) tablet Take 650 mg by mouth Daily (before breakfast).  pantoprazole (PROTONIX) 40 mg tablet Take 40 mg by mouth two (2) times a day.  albuterol (PROVENTIL HFA, VENTOLIN HFA, PROAIR HFA) 90 mcg/actuation inhaler Take 2 Puffs by inhalation every four (4) hours as needed for Wheezing or Shortness of Breath.  sucralfate (CARAFATE) 1 gram tablet Take 1 g by mouth Before breakfast, lunch, dinner and at bedtime.  PARoxetine (PAXIL) 40 mg tablet Take 40 mg by mouth daily. Indications: bipolar depression    ziprasidone hcl (Geodon) 60 mg capsule Take 60 mg by mouth two (2) times daily (with meals).  zolpidem (Ambien) 10 mg tablet Take 10 mg by mouth nightly as needed for Sleep.  metoprolol tartrate (LOPRESSOR) 100 mg IR tablet Take 100 mg by mouth every twelve (12) hours.       PAST MEDICAL HISTORY: Past medical history from the initial psychiatric evaluation has been reviewed (reviewed/updated 4/9/2021) with no additional updates (I asked patient and no additional past medical history provided). Past Medical History:   Diagnosis Date    Bipolar 1 disorder ([facility] [address]     Chronic back pain     Cyst near tailbone     Scoliosis    History reviewed. No pertinent surgical history. SOCIAL HISTORY: Social history from the initial psychiatric evaluation has been reviewed (reviewed/updated 4/9/2021) with no additional updates (I asked patient and no additional social history provided). Social History     Socioeconomic History    Marital status: SINGLE     Spouse name: Not on file    Number of children: Not on file    Years of education: Not on file    Highest education level: Not on file   Occupational History    Not on file   Social Needs    Financial resource strain: Not on file    Food insecurity     Worry: Not on file     Inability: Not on file    Transportation needs     Medical: Not on file     Non-medical: Not on file   Tobacco Use    Smoking status: Current Every Day Smoker     Types: Cigarettes   Substance and Sexual Activity    Alcohol use:  Yes     Alcohol/week: 12.0 standard drinks     Types: 12 Shots of liquor per week     Comment: daily    Drug use: Not on file    Sexual activity: Not Currently   Lifestyle    Physical activity     Days per week: Not on file     Minutes per session: Not on file    Stress: Not on file   Relationships    Social connections     Talks on phone: Not on file     Gets together: Not on file     Attends Confucianism service: Not on file     Active member of club or organization: Not on file     Attends meetings of clubs or organizations: Not on file     Relationship status: Not on file    Intimate partner violence     Fear of current or ex partner: Not on file     Emotionally abused: Not on file     Physically abused: Not on file     Forced sexual activity: Not on file   Other Topics Concern     Service Not Asked    Blood Transfusions Not Asked    Caffeine Concern Not Asked    Occupational Exposure Not Asked    Hobby Hazards Not Asked    Sleep Concern Not Asked    Stress Concern Not Asked    Weight Concern Not Asked    Special Diet Not Asked    Back Care Not Asked    Exercise Not Asked    Bike Helmet Not Asked   [address] Not Asked    Self-Exams Not Asked   Social History Narrative    Not on file      FAMILY HISTORY: Family history from the initial psychiatric evaluation has been reviewed (reviewed/updated 4/9/2021) with no additional updates (I asked patient and no additional family history provided). History reviewed. No pertinent family history. REVIEW OF SYSTEMS: (reviewed/updated 4/9/2021)  Appetite:no change from normal   Sleep: poor with DIMS (difficulty initiating & maintaining sleep)   All other Review of Systems: Negative except chronic pain per HPI         [facility] (MSE):    MSE FINDINGS ARE WITHIN NORMAL LIMITS (WNL) UNLESS OTHERWISE STATED BELOW. ( ALL OF THE BELOW CATEGORIES OF THE MSE HAVE BEEN REVIEWED (reviewed 4/9/2021) AND UPDATED AS DEEMED APPROPRIATE )  General Presentation age appropriate, evasive and guarded   Orientation oriented to time, place and person   Vital Signs  See below (reviewed 4/9/2021); Vital Signs (BP, Pulse, & Temp) are within normal limits if not listed below.    Gait and Station Stable/steady, no ataxia   Musculoskeletal System No extrapyramidal symptoms (EPS); no abnormal muscular movements or Tardive Dyskinesia (TD); muscle strength and tone are within normal limits   Language No aphasia or dysarthria   Speech:  normal volume and pressured   Thought Processes coherent; normal rate of thoughts; fair abstract reasoning/computation   Thought Associations goal directed   Thought Content preoccupations   Suicidal Ideations none   Homicidal Ideations contracts for safety   Mood:  irritable and labile    Affect:  constricted and increased in intensity   Memory recent  intact   Memory remote:  intact   Concentration/Attention:  intact   Fund of Knowledge average   Insight:  limited   Reliability fair   Judgment:  poor          VITALS:     Patient Vitals for the past 24 hrs:   Temp Pulse Resp BP SpO2   04/09/21 0759 98.1 °F (36.7 °C) 98 16 128/76 94 %   04/08/21 2001 98.2 °F (36.8 °C) 86 14 111/74 97 %     Wt Readings from Last 3 Encounters:   04/07/21 59 kg (130 lb)     Temp Readings from Last 3 Encounters:   04/09/21 98.1 °F (36.7 °C)     BP Readings from Last 3 Encounters:   04/09/21 128/76     Pulse Readings from Last 3 Encounters:   04/09/21 98            DATA     LABORATORY DATA:(reviewed/updated 4/9/2021)  Recent Results (from the past 24 hour(s))   TSH 3RD GENERATION    Collection Time: 04/09/21  4:51 AM   Result Value Ref Range    TSH 0.66 0.36 - 3.74 uIU/mL   LIPID PANEL    Collection Time: 04/09/21  4:51 AM   Result Value Ref Range    LIPID PROFILE          Cholesterol, total 150 <200 MG/DL    Triglyceride 71 <150 MG/DL    HDL Cholesterol 80 MG/DL    LDL, calculated 55.8 0 - 100 MG/DL    VLDL, calculated 14.2 MG/DL    CHOL/HDL Ratio 1.9 0.0 - 5.0     HEMOGLOBIN A1C WITH EAG    Collection Time: 04/09/21  4:51 AM   Result Value Ref Range    Hemoglobin A1c 4.7 4.0 - 5.6 %    Est. average glucose 88 mg/dL   CBC W/O DIFF    Collection Time: 04/09/21  4:51 AM   Result Value Ref Range    WBC 4.3 3.6 - 11.0 K/uL    RBC 3.99 3.80 - 5.20 M/uL    HGB 9.4 (L) 11.5 - 16.0 g/dL    HCT 30.7 (L) 35.0 - 47.0 %    MCV 76.9 (L) 80.0 - 99.0 FL    MCH 23.6 (L) 26.0 - 34.0 PG    MCHC 30.6 30.0 - 36.5 g/dL    RDW 23.9 (H) 11.5 - 14.5 %    PLATELET 368 552 - 964 K/uL    MPV 9.3 8.9 - 12.9 FL    NRBC 0.0 0  WBC    ABSOLUTE NRBC 0.00 0.00 - 0.01 K/uL   GGT    Collection Time: 04/09/21  4:51 AM   Result Value Ref Range    GGT 28 5 - 55 U/L   METABOLIC PANEL, COMPREHENSIVE Collection Time: 04/09/21  4:51 AM   Result Value Ref Range    Sodium 142 136 - 145 mmol/L    Potassium 3.2 (L) 3.5 - 5.1 mmol/L    Chloride 104 97 - 108 mmol/L    CO2 28 21 - 32 mmol/L    Anion gap 10 5 - 15 mmol/L    Glucose 89 65 - 100 mg/dL    BUN 7 6 - 20 MG/DL    Creatinine 0.59 0.55 - 1.02 MG/DL    BUN/Creatinine ratio 12 12 - 20      GFR est AA >60 >60 ml/min/1.73m2    GFR est non-AA >60 >60 ml/min/1.73m2    Calcium 8.4 (L) 8.5 - 10.1 MG/DL    Bilirubin, total 0.4 0.2 - 1.0 MG/DL    ALT (SGPT) 16 12 - 78 U/L    AST (SGOT) 23 15 - 37 U/L    Alk. phosphatase 65 45 - 117 U/L    Protein, total 7.0 6.4 - 8.2 g/dL    Albumin 3.2 (L) 3.5 - 5.0 g/dL    Globulin 3.8 2.0 - 4.0 g/dL    A-G Ratio 0.8 (L) 1.1 - 2.2       No results found for: VALF2, VALAC, VALP, VALPR, DS6, CRBAM, CRBAMP, CARB2, XCRBAM  No results found for: LITHM   RADIOLOGY REPORTS:(reviewed/updated 4/9/2021)  No results found.        MEDICATIONS     ALL MEDICATIONS:   Current Facility-Administered Medications   Medication Dose Route Frequency    oxyCODONE IR (ROXICODONE) tablet 5 mg  5 mg Oral Q6H PRN    acetaminophen (TYLENOL) tablet 650 mg  650 mg Oral Q4H PRN    ondansetron (ZOFRAN ODT) tablet 4 mg  4 mg Oral Q6H PRN    ondansetron (ZOFRAN) injection 4 mg  4 mg IntraMUSCular Q8H PRN    cloNIDine HCL (CATAPRES) tablet 0.1 mg  0.1 mg Oral QID PRN    pregabalin (LYRICA) capsule 75 mg  75 mg Oral BID    sucralfate (CARAFATE) tablet 1 g  1 g Oral AC&HS    ferrous sulfate tablet 650 mg  650 mg Oral ACB    albuterol (PROVENTIL HFA, VENTOLIN HFA, PROAIR HFA) inhaler 2 Puff  2 Puff Inhalation Q4H PRN    pantoprazole (PROTONIX) tablet 40 mg  40 mg Oral ACB&D    PHENobarbitaL (LUMINAL) tablet 32.4 mg  32.4 mg Oral QID    Followed by   Noah Denson ON 4/10/2021] PHENobarbitaL (LUMINAL) tablet 32.4 mg  32.4 mg Oral BID    Followed by   Noah Denson ON 4/11/2021] PHENobarbitaL (LUMINAL) tablet 16.2 mg  16.2 mg Oral BID    PHENobarbitaL (LUMINAL) tablet 32.4 mg  32.4 mg Oral Q6H PRN    Followed by   Tessie Felty ON 4/10/2021] PHENobarbitaL (LUMINAL) tablet 16.2 mg  16.2 mg Oral Q6H PRN    thiamine HCL (B-1) tablet 100 mg  100 mg Oral DAILY    folic acid (FOLVITE) tablet 1 mg  1 mg Oral DAILY    therapeutic multivitamin (THERAGRAN) tablet 1 Tab  1 Tab Oral DAILY    OLANZapine (ZyPREXA) tablet 5 mg  5 mg Oral Q6H PRN    haloperidol lactate (HALDOL) injection 5 mg  5 mg IntraMUSCular Q6H PRN    benztropine (COGENTIN) tablet 1 mg  1 mg Oral BID PRN    diphenhydrAMINE (BENADRYL) injection 50 mg  50 mg IntraMUSCular BID PRN    hydrOXYzine HCL (ATARAX) tablet 50 mg  50 mg Oral TID PRN    LORazepam (ATIVAN) injection 1 mg  1 mg IntraMUSCular Q4H PRN    traZODone (DESYREL) tablet 50 mg  50 mg Oral QHS PRN    magnesium hydroxide (MILK OF MAGNESIA) 400 mg/5 mL oral suspension 30 mL  30 mL Oral DAILY PRN    ibuprofen (MOTRIN) tablet 400 mg  400 mg Oral Q8H PRN    nicotine (NICODERM CQ) 21 mg/24 hr patch 1 Patch  1 Patch TransDERmal DAILY    ziprasidone (GEODON) capsule 60 mg  60 mg Oral BID WITH MEALS      SCHEDULED MEDICATIONS:   Current Facility-Administered Medications   Medication Dose Route Frequency    pregabalin (LYRICA) capsule 75 mg  75 mg Oral BID    sucralfate (CARAFATE) tablet 1 g  1 g Oral AC&HS    ferrous sulfate tablet 650 mg  650 mg Oral ACB    pantoprazole (PROTONIX) tablet 40 mg  40 mg Oral ACB&D    PHENobarbitaL (LUMINAL) tablet 32.4 mg  32.4 mg Oral QID    Followed by   Tessie Felty ON 4/10/2021] PHENobarbitaL (LUMINAL) tablet 32.4 mg  32.4 mg Oral BID    Followed by   Tessie Felty ON 4/11/2021] PHENobarbitaL (LUMINAL) tablet 16.2 mg  16.2 mg Oral BID    thiamine HCL (B-1) tablet 100 mg  100 mg Oral DAILY    folic acid (FOLVITE) tablet 1 mg  1 mg Oral DAILY    therapeutic multivitamin (THERAGRAN) tablet 1 Tab  1 Tab Oral DAILY    nicotine (NICODERM CQ) 21 mg/24 hr patch 1 Patch  1 Patch TransDERmal DAILY    ziprasidone (GEODON) capsule 60 mg  60 mg Oral BID WITH MEALS          ASSESSMENT & PLAN     DIAGNOSES REQUIRING ACTIVE TREATMENT AND MONITORING: (reviewed/updated 4/9/2021)  Patient Active Hospital Problem List:   Bipolar disorder, curr episode mixed, severe, w/o psychotic features ([facility] [address] (4/8/2021)    Assessment: patient with ongoing mood lability, though much of her presentation can be attributed to polysubstance use, namely marijuana and alcohol which she has been using frequently. Treatment will focus on sobriety, stopping substances with significant psychiatric side effects, and encouraging sobriety. Plan:  - DISCONTINUE medicinal marijuana due to psychosis  - CONTINUE Geodon 60 mg BID for bipolar disorder  - DISCONTINUE Paxil due to mood lability  - CONTINUE Lyrica 75 mg BID for pain control  - START Roxicodone 5 mg Q6H PRN #2 for moderate to severe pain  - Consider mood stabilizer  - DISCONTINUE Ambien due to polypharmacy  - CONTINUE EtOH withdrawal precautions  - CONTINUE Phenobarbital high dose taper (64 --> 16 mg)  - IGM therapy as tolerated  - Expand database / obtain collateral  - Dispo planning (rehab vs home)     In summary, Gilbert Lucio, is a 39 y.o.  female who presents with a severe exacerbation of the principal diagnosis of Bipolar disorder, curr episode mixed, severe, w/o psychotic features ([facility] [address]    Patient's condition is not improving. Patient requires continued inpatient hospitalization for further stabilization, safety monitoring and medication management. I will continue to coordinate the provision of individual, milieu, occupational, group, and substance abuse therapies to address target symptoms/diagnoses as deemed appropriate for the individual patient. A coordinated, multidisplinary treatment team round was conducted with the patient (this team consists of the nurse, psychiatric unit pharmacist,  and writer).      Complete current electronic health record for patient has been reviewed today including consultant notes, ancillary staff notes, nurses and psychiatric tech notes. Suicide risk assessment completed and patient deemed to be of low risk for suicide at this time. The following regarding medications was addressed during rounds with patient:   the risks and benefits of the proposed medication. The patient was given the opportunity to ask questions. Informed consent given to the use of the above medications. Will continue to adjust psychiatric and non-psychiatric medications (see above \"medication\" section and orders section for details) as deemed appropriate & based upon diagnoses and response to treatment. I will continue to order blood tests/labs and diagnostic tests as deemed appropriate and review results as they become available (see orders for details and above listed lab/test results). I will order psychiatric records from previous [facility] hospitals to further elucidate the nature of patient's psychopathology and review once available. I will gather additional collateral information from friends, family and o/p treatment team to further elucidate the nature of patient's psychopathology and baselline level of psychiatric functioning. I certify that this patient's inpatient psychiatric hospital services furnished since the previous certification were, and continue to be, required for treatment that could reasonably be expected to improve the patient's condition, or for diagnostic study, and that the patient continues to need, on a daily basis, active treatment furnished directly by or requiring the supervision of inpatient psychiatric facility personnel. In addition, the hospital records show that services furnished were intensive treatment services, admission or related services, or equivalent services.     EXPECTED DISCHARGE DATE/DAY: TBD     DISPOSITION: CSU vs rehab       Signed By:   Tera Sun MD  4/9/2021

## 2021-04-09 NOTE — BH NOTES
0730-Assumed care of patient from off going nurse. Patient alert, verbal and oriented x4. Patient rates her anxiety a 10/10 and depression 8/10. Patient denies SI, HI and AVH. Patient med and meal compliant. Patient seen visible in dayroom and hallway. Patient has taken multiple naps throughout the day. Patient participated during treatment team. Q15 minute checks continued for safety. 0825-Atarax given for anxiety. 1017-PRN Zyprexa given for agitation and \"feeling like I'm about to jump out of my body. \"  1550-PRN Tylenol given for pain. 1726-PRN oxycodone given for pain.      Problem: Chemical Dependency (Adult/Pediatric)  Goal: *STG: Participates in treatment plan  Outcome: Not Progressing Towards Goal  Goal: *STG: Complies with medication therapy  Outcome: Progressing Towards Goal  Goal: *STG: Attends activities and groups  Outcome: Not Progressing Towards Goal

## 2021-04-10 PROCEDURE — 74011250637 HC RX REV CODE- 250/637: Performed by: PSYCHIATRY & NEUROLOGY

## 2021-04-10 PROCEDURE — 74011250637 HC RX REV CODE- 250/637: Performed by: NURSE PRACTITIONER

## 2021-04-10 PROCEDURE — 65220000003 HC RM SEMIPRIVATE PSYCH

## 2021-04-10 RX ORDER — HYDROXYZINE 25 MG/1
50 TABLET, FILM COATED ORAL 3 TIMES DAILY
Status: DISCONTINUED | OUTPATIENT
Start: 2021-04-10 | End: 2021-04-13 | Stop reason: HOSPADM

## 2021-04-10 RX ORDER — PAROXETINE HYDROCHLORIDE 20 MG/1
20 TABLET, FILM COATED ORAL DAILY
Status: DISCONTINUED | OUTPATIENT
Start: 2021-04-10 | End: 2021-04-13 | Stop reason: HOSPADM

## 2021-04-10 RX ADMIN — SUCRALFATE 1 G: 1 TABLET ORAL at 21:29

## 2021-04-10 RX ADMIN — ZIPRASIDONE HYDROCHLORIDE 60 MG: 20 CAPSULE ORAL at 16:54

## 2021-04-10 RX ADMIN — PHENOBARBITAL 32.4 MG: 32.4 TABLET ORAL at 21:33

## 2021-04-10 RX ADMIN — FERROUS SULFATE TAB 325 MG (65 MG ELEMENTAL FE) 650 MG: 325 (65 FE) TAB at 06:31

## 2021-04-10 RX ADMIN — ZIPRASIDONE HYDROCHLORIDE 60 MG: 20 CAPSULE ORAL at 07:43

## 2021-04-10 RX ADMIN — PANTOPRAZOLE SODIUM 40 MG: 40 TABLET, DELAYED RELEASE ORAL at 16:54

## 2021-04-10 RX ADMIN — HYDROXYZINE HYDROCHLORIDE 50 MG: 25 TABLET, FILM COATED ORAL at 09:35

## 2021-04-10 RX ADMIN — OLANZAPINE 5 MG: 5 TABLET, FILM COATED ORAL at 12:40

## 2021-04-10 RX ADMIN — ACETAMINOPHEN 650 MG: 325 TABLET ORAL at 19:35

## 2021-04-10 RX ADMIN — OXYCODONE HYDROCHLORIDE 5 MG: 5 TABLET ORAL at 07:55

## 2021-04-10 RX ADMIN — OXYCODONE HYDROCHLORIDE 5 MG: 5 TABLET ORAL at 21:29

## 2021-04-10 RX ADMIN — PREGABALIN 75 MG: 75 CAPSULE ORAL at 07:44

## 2021-04-10 RX ADMIN — Medication 100 MG: at 07:43

## 2021-04-10 RX ADMIN — SUCRALFATE 1 G: 1 TABLET ORAL at 16:54

## 2021-04-10 RX ADMIN — OXYCODONE HYDROCHLORIDE 5 MG: 5 TABLET ORAL at 14:16

## 2021-04-10 RX ADMIN — PHENOBARBITAL 32.4 MG: 32.4 TABLET ORAL at 16:54

## 2021-04-10 RX ADMIN — TRAZODONE HYDROCHLORIDE 50 MG: 50 TABLET ORAL at 21:29

## 2021-04-10 RX ADMIN — PREGABALIN 75 MG: 75 CAPSULE ORAL at 16:54

## 2021-04-10 RX ADMIN — SUCRALFATE 1 G: 1 TABLET ORAL at 12:04

## 2021-04-10 RX ADMIN — PANTOPRAZOLE SODIUM 40 MG: 40 TABLET, DELAYED RELEASE ORAL at 07:44

## 2021-04-10 RX ADMIN — ACETAMINOPHEN 650 MG: 325 TABLET ORAL at 06:33

## 2021-04-10 RX ADMIN — THERA TABS 1 TABLET: TAB at 07:44

## 2021-04-10 RX ADMIN — PHENOBARBITAL 32.4 MG: 32.4 TABLET ORAL at 07:44

## 2021-04-10 RX ADMIN — SUCRALFATE 1 G: 1 TABLET ORAL at 07:45

## 2021-04-10 RX ADMIN — ACETAMINOPHEN 650 MG: 325 TABLET ORAL at 09:35

## 2021-04-10 RX ADMIN — PAROXETINE HYDROCHLORIDE 20 MG: 20 TABLET, FILM COATED ORAL at 13:12

## 2021-04-10 RX ADMIN — FOLIC ACID 1 MG: 1 TABLET ORAL at 07:44

## 2021-04-10 RX ADMIN — PHENOBARBITAL 32.4 MG: 32.4 TABLET ORAL at 01:32

## 2021-04-10 RX ADMIN — HYDROXYZINE HYDROCHLORIDE 50 MG: 25 TABLET, FILM COATED ORAL at 16:54

## 2021-04-10 NOTE — PROGRESS NOTES
Problem: Chemical Dependency (Adult/Pediatric)  Goal: *STG: Participates in treatment plan  Outcome: Progressing Towards Goal  Goal: *STG: Remains safe in hospital  Outcome: Progressing Towards Goal  Goal: *STG: Seeks staff when symptoms of withdrawal increase  Outcome: Progressing Towards Goal  Goal: *STG: Complies with medication therapy  Outcome: Progressing Towards Goal     Problem: Chemical Dependency (Adult/Pediatric)  Goal: *STG: Participates in treatment plan  Outcome: Progressing Towards Goal  Goal: *STG: Remains safe in hospital  Outcome: Progressing Towards Goal  Goal: *STG: Complies with medication therapy  Outcome: Progressing Towards Goal

## 2021-04-10 NOTE — BH NOTES
SW called pt's  Lida Jaeger [phone], per patient's request, to bring items from pt's home. SW updated pt's  on her treatment, pt's  is requesting to be notified with discharge date and placement. Nurses staff notified of items pt's  is dropping off.      LAUREEN Rosario

## 2021-04-10 NOTE — BH NOTES
PSYCHIATRIC PROGRESS NOTE         Patient Name  [address]   Date of Birth 1975   CSN [id number]   Medical Record Number  [id number]      Age  39 y.o. PCP Other, MD Grace   Admit date:  4/7/2021    Room Number  324/01  @ [facility]   Date of Service  4/10/2021         E & M PROGRESS NOTE:         HISTORY       CC:  \"psychosis / HI\"  HISTORY OF PRESENT ILLNESS/INTERVAL HISTORY:  (reviewed/updated 4/10/2021). per initial evaluation: The patient, Beverley Penn, is a 39 y.o. WHITE female with a past psychiatric history significant for bipolar disorder and etoh use disorder, who presents at this time with complaints of (and/or evidence of) the following emotional symptoms: psychotic behavior and servando. Additional symptomatology include homicidal ideation. The above symptoms have been present for 2+ weeks. These symptoms are of moderate to high severity. These symptoms are constant in nature. The patient's condition has been precipitated by psychosocial stressors. Patient's condition made worse by continued psychoactive drug use and alcohol use as well as treatment noncompliance. UDS: +THC; BAL=311 at admission.     Per documentation, patient has been acting more erratically at home, and recently threatening to kill her  by cutting his throat. She has been using combinations of medicinal marijuana and pain medication as well as up to a liter of alcohol daily.      The patient is an unreliable historian. The patient corroborates the above narrative. The patient contracts for safety on the unit and gives consent for the team to contact collateral. The patient is amenable to initiating treatment while on the unit. The patient reports that she is feeling physically debilitated and acknowledges alcohol withdrawal symptoms. Patient denies active thoughts of self harm.  Education provided regarding the deleterious effects of combining psychoactive drugs with alcohol and painkillers, patient voiced understanding. 4/09 - patient slept 8 hours overnight, she reports high anxiety (\"10/10\") and is reporting severe and chronic pain, which she states is not helped by NSAIDs (allergy) or Tylenol (ineffective). Patient denies SI/HI/AVH, declines rehab placement. Per nursing, patient was served with a protective order yesterday and cannot return home upon discharge. SW working on a safe disposition plan. Patient irritable but cooperative with treatment thus far. No significant withdrawal symptoms noted, CIWA scores have been 0-6 and typically due to anxiety. Patient tolerating phenobarbital taper, no tremor or diaphoresis observed. 4/10/21  Janette reports feeling slightly better. However her anxiety remains \"high\". Says she is in [place] state of perpetual panic\" although she was quite relaxed and calm during the interview. Only used Vistaril twice yesterday and once today. I encouraged her to use it more often. Denies any AH or VH. She reports being on Paxil 60mg prior to admission and would like to go back on this. Has not taken it in several days. I discussed with her the increased risk of mood lability but she would like to restart this. Restarted on 20mg daily. SIDE EFFECTS: (reviewed/updated 4/10/2021)  None reported or admitted to. ALLERGIES:(reviewed/updated 4/10/2021)  Allergies   Allergen Reactions    Prednisone Shortness of Breath    Venom-Honey Bee Swelling      MEDICATIONS PRIOR TO ADMISSION:(reviewed/updated 4/10/2021)  Medications Prior to Admission   Medication Sig    oxyCODONE IR (ROXICODONE) 5 mg immediate release tablet Take 7.5 mg by mouth every six (6) hours as needed for Pain.  ferrous sulfate 325 mg (65 mg iron) tablet Take 650 mg by mouth Daily (before breakfast).  pantoprazole (PROTONIX) 40 mg tablet Take 40 mg by mouth two (2) times a day.     albuterol (PROVENTIL HFA, VENTOLIN HFA, PROAIR HFA) 90 mcg/actuation inhaler Take 2 Puffs by inhalation every four (4) hours as needed for Wheezing or Shortness of Breath.  sucralfate (CARAFATE) 1 gram tablet Take 1 g by mouth Before breakfast, lunch, dinner and at bedtime.  PARoxetine (PAXIL) 40 mg tablet Take 40 mg by mouth daily. Indications: bipolar depression    ziprasidone hcl (Geodon) 60 mg capsule Take 60 mg by mouth two (2) times daily (with meals).  zolpidem (Ambien) 10 mg tablet Take 10 mg by mouth nightly as needed for Sleep.  metoprolol tartrate (LOPRESSOR) 100 mg IR tablet Take 100 mg by mouth every twelve (12) hours. PAST MEDICAL HISTORY: Past medical history from the initial psychiatric evaluation has been reviewed (reviewed/updated 4/10/2021) with no additional updates (I asked patient and no additional past medical history provided). Past Medical History:   Diagnosis Date    Bipolar 1 disorder ([facility] [address]     Chronic back pain     Cyst near tailbone     Scoliosis    History reviewed. No pertinent surgical history. SOCIAL HISTORY: Social history from the initial psychiatric evaluation has been reviewed (reviewed/updated 4/10/2021) with no additional updates (I asked patient and no additional social history provided). Social History     Socioeconomic History    Marital status: SINGLE     Spouse name: Not on file    Number of children: Not on file    Years of education: Not on file    Highest education level: Not on file   Occupational History    Not on file   Social Needs    Financial resource strain: Not on file    Food insecurity     Worry: Not on file     Inability: Not on file    Transportation needs     Medical: Not on file     Non-medical: Not on file   Tobacco Use    Smoking status: Current Every Day Smoker     Types: Cigarettes   Substance and Sexual Activity    Alcohol use:  Yes     Alcohol/week: 12.0 standard drinks     Types: 12 Shots of liquor per week     Comment: daily    Drug use: Not on file    Sexual activity: Not Currently   Lifestyle    Physical activity     Days per week: Not on file     Minutes per session: Not on file    Stress: Not on file   Relationships    Social connections     Talks on phone: Not on file     Gets together: Not on file     Attends Restorationist service: Not on file     Active member of club or organization: Not on file     Attends meetings of clubs or organizations: Not on file     Relationship status: Not on file    Intimate partner violence     Fear of current or ex partner: Not on file     Emotionally abused: Not on file     Physically abused: Not on file     Forced sexual activity: Not on file   Other Topics Concern     Service Not Asked    Blood Transfusions Not Asked    Caffeine Concern Not Asked    Occupational Exposure Not Asked   Oksana Salter Hazards Not Asked    Sleep Concern Not Asked    Stress Concern Not Asked    Weight Concern Not Asked    Special Diet Not Asked    Back Care Not Asked    Exercise Not Asked    Bike Helmet Not Asked   [address] Not Asked    Self-Exams Not Asked   Social History Narrative    Not on file      FAMILY HISTORY: Family history from the initial psychiatric evaluation has been reviewed (reviewed/updated 4/10/2021) with no additional updates (I asked patient and no additional family history provided). History reviewed. No pertinent family history.     REVIEW OF SYSTEMS: (reviewed/updated 4/10/2021)  Appetite:no change from normal   Sleep: poor with DIMS (difficulty initiating & maintaining sleep)   All other Review of Systems: Negative except chronic pain per HPI         [facility] (MSE):    MSE FINDINGS ARE WITHIN NORMAL LIMITS (WNL) UNLESS OTHERWISE STATED BELOW. ( ALL OF THE BELOW CATEGORIES OF THE MSE HAVE BEEN REVIEWED (reviewed 4/10/2021) AND UPDATED AS DEEMED APPROPRIATE )  General Presentation age appropriate, evasive and guarded   Orientation oriented to time, place and person   Vital Signs  See below (reviewed 4/10/2021); Vital Signs (BP, Pulse, & Temp) are within normal limits if not listed below. Gait and Station Stable/steady, no ataxia   Musculoskeletal System No extrapyramidal symptoms (EPS); no abnormal muscular movements or Tardive Dyskinesia (TD); muscle strength and tone are within normal limits   Language No aphasia or dysarthria   Speech:  normal volume and pressured   Thought Processes coherent; normal rate of thoughts; fair abstract reasoning/computation   Thought Associations goal directed   Thought Content preoccupations   Suicidal Ideations none   Homicidal Ideations contracts for safety   Mood:  irritable and labile    Affect:  constricted and increased in intensity   Memory recent  intact   Memory remote:  intact   Concentration/Attention:  intact   Fund of Knowledge average   Insight:  limited   Reliability fair   Judgment:  poor          VITALS:     Patient Vitals for the past 24 hrs:   Temp Pulse Resp BP SpO2   04/10/21 0800 98 °F (36.7 °C) 95 16 137/80 99 %   04/09/21 2022 98 °F (36.7 °C) 94 18 (!) 142/82 98 %     Wt Readings from Last 3 Encounters:   04/07/21 59 kg (130 lb)     Temp Readings from Last 3 Encounters:   04/10/21 98 °F (36.7 °C)     BP Readings from Last 3 Encounters:   04/10/21 137/80     Pulse Readings from Last 3 Encounters:   04/10/21 95            DATA     LABORATORY DATA:(reviewed/updated 4/10/2021)  No results found for this or any previous visit (from the past 24 hour(s)). No results found for: VALF2, VALAC, VALP, VALPR, DS6, CRBAM, CRBAMP, CARB2, XCRBAM  No results found for: LITHM   RADIOLOGY REPORTS:(reviewed/updated 4/10/2021)  No results found.        MEDICATIONS     ALL MEDICATIONS:   Current Facility-Administered Medications   Medication Dose Route Frequency    oxyCODONE IR (ROXICODONE) tablet 5 mg  5 mg Oral Q6H PRN    acetaminophen (TYLENOL) tablet 650 mg  650 mg Oral Q4H PRN    ondansetron (ZOFRAN ODT) tablet 4 mg  4 mg Oral Q6H PRN    ondansetron (ZOFRAN) injection 4 mg  4 mg IntraMUSCular Q8H PRN    cloNIDine HCL (CATAPRES) tablet 0.1 mg  0.1 mg Oral QID PRN    pregabalin (LYRICA) capsule 75 mg  75 mg Oral BID    sucralfate (CARAFATE) tablet 1 g  1 g Oral AC&HS    ferrous sulfate tablet 650 mg  650 mg Oral ACB    albuterol (PROVENTIL HFA, VENTOLIN HFA, PROAIR HFA) inhaler 2 Puff  2 Puff Inhalation Q4H PRN    pantoprazole (PROTONIX) tablet 40 mg  40 mg Oral ACB&D    PHENobarbitaL (LUMINAL) tablet 32.4 mg  32.4 mg Oral BID    Followed by   Jovanna Thakur ON 4/11/2021] PHENobarbitaL (LUMINAL) tablet 16.2 mg  16.2 mg Oral BID    PHENobarbitaL (LUMINAL) tablet 32.4 mg  32.4 mg Oral Q6H PRN    Followed by   [facility] PHENobarbitaL (LUMINAL) tablet 16.2 mg  16.2 mg Oral Q6H PRN    thiamine HCL (B-1) tablet 100 mg  100 mg Oral DAILY    folic acid (FOLVITE) tablet 1 mg  1 mg Oral DAILY    therapeutic multivitamin (THERAGRAN) tablet 1 Tab  1 Tab Oral DAILY    OLANZapine (ZyPREXA) tablet 5 mg  5 mg Oral Q6H PRN    haloperidol lactate (HALDOL) injection 5 mg  5 mg IntraMUSCular Q6H PRN    benztropine (COGENTIN) tablet 1 mg  1 mg Oral BID PRN    diphenhydrAMINE (BENADRYL) injection 50 mg  50 mg IntraMUSCular BID PRN    hydrOXYzine HCL (ATARAX) tablet 50 mg  50 mg Oral TID PRN    LORazepam (ATIVAN) injection 1 mg  1 mg IntraMUSCular Q4H PRN    traZODone (DESYREL) tablet 50 mg  50 mg Oral QHS PRN    magnesium hydroxide (MILK OF MAGNESIA) 400 mg/5 mL oral suspension 30 mL  30 mL Oral DAILY PRN    ibuprofen (MOTRIN) tablet 400 mg  400 mg Oral Q8H PRN    nicotine (NICODERM CQ) 21 mg/24 hr patch 1 Patch  1 Patch TransDERmal DAILY    ziprasidone (GEODON) capsule 60 mg  60 mg Oral BID WITH MEALS      SCHEDULED MEDICATIONS:   Current Facility-Administered Medications   Medication Dose Route Frequency    pregabalin (LYRICA) capsule 75 mg  75 mg Oral BID    sucralfate (CARAFATE) tablet 1 g  1 g Oral AC&HS    ferrous sulfate tablet 650 mg  650 mg Oral ACB    pantoprazole (PROTONIX) tablet 40 mg 40 mg Oral ACB&D    PHENobarbitaL (LUMINAL) tablet 32.4 mg  32.4 mg Oral BID    Followed by   Daniela Uriarte ON 4/11/2021] PHENobarbitaL (LUMINAL) tablet 16.2 mg  16.2 mg Oral BID    thiamine HCL (B-1) tablet 100 mg  100 mg Oral DAILY    folic acid (FOLVITE) tablet 1 mg  1 mg Oral DAILY    therapeutic multivitamin (THERAGRAN) tablet 1 Tab  1 Tab Oral DAILY    nicotine (NICODERM CQ) 21 mg/24 hr patch 1 Patch  1 Patch TransDERmal DAILY    ziprasidone (GEODON) capsule 60 mg  60 mg Oral BID WITH MEALS          ASSESSMENT & PLAN     DIAGNOSES REQUIRING ACTIVE TREATMENT AND MONITORING: (reviewed/updated 4/10/2021)  Patient Active Hospital Problem List:   Bipolar disorder, curr episode mixed, severe, w/o psychotic features ([facility] [address] (4/8/2021)    Assessment: patient with ongoing mood lability, though much of her presentation can be attributed to polysubstance use, namely marijuana and alcohol which she has been using frequently. Treatment will focus on sobriety, stopping substances with significant psychiatric side effects, and encouraging sobriety. Plan:  - DISCONTINUE medicinal marijuana due to psychosis  - CONTINUE Geodon 60 mg BID for bipolar disorder  - Restart Paxil 20mg daily.   - CONTINUE Lyrica 75 mg BID for pain control  - START Roxicodone 5 mg Q6H PRN #2 for moderate to severe pain  - Consider mood stabilizer  - DISCONTINUE Ambien due to polypharmacy  - CONTINUE EtOH withdrawal precautions  - CONTINUE Phenobarbital high dose taper (64 --> 16 mg)  - IGM therapy as tolerated  - Expand database / obtain collateral  - Dispo planning (rehab vs home)     In summary, Sammy Heredia, is a 39 y.o.  female who presents with a severe exacerbation of the principal diagnosis of Bipolar disorder, curr episode mixed, severe, w/o psychotic features ([facility] [address]    Patient's condition is not improving. Patient requires continued inpatient hospitalization for further stabilization, safety monitoring and medication management.   I will continue to coordinate the provision of individual, milieu, occupational, group, and substance abuse therapies to address target symptoms/diagnoses as deemed appropriate for the individual patient. A coordinated, multidisplinary treatment team round was conducted with the patient (this team consists of the nurse, psychiatric unit pharmacist,  and writer). Complete current electronic health record for patient has been reviewed today including consultant notes, ancillary staff notes, nurses and psychiatric tech notes. Suicide risk assessment completed and patient deemed to be of low risk for suicide at this time. The following regarding medications was addressed during rounds with patient:   the risks and benefits of the proposed medication. The patient was given the opportunity to ask questions. Informed consent given to the use of the above medications. Will continue to adjust psychiatric and non-psychiatric medications (see above \"medication\" section and orders section for details) as deemed appropriate & based upon diagnoses and response to treatment. I will continue to order blood tests/labs and diagnostic tests as deemed appropriate and review results as they become available (see orders for details and above listed lab/test results). I will order psychiatric records from previous [facility] hospitals to further elucidate the nature of patient's psychopathology and review once available. I will gather additional collateral information from friends, family and o/p treatment team to further elucidate the nature of patient's psychopathology and baselline level of psychiatric functioning.          I certify that this patient's inpatient psychiatric hospital services furnished since the previous certification were, and continue to be, required for treatment that could reasonably be expected to improve the patient's condition, or for diagnostic study, and that the patient continues to need, on a daily basis, active treatment furnished directly by or requiring the supervision of inpatient psychiatric facility personnel. In addition, the hospital records show that services furnished were intensive treatment services, admission or related services, or equivalent services.     EXPECTED DISCHARGE DATE/DAY: TBD     DISPOSITION: CSU vs rehab       Signed By:   Leny Akers MD  4/10/2021

## 2021-04-10 NOTE — PROGRESS NOTES
Patient is A&O, pleasant and cooperative. She denies SI HI and AVH. She denies SS of withdrawal, stating her last drink was 3 days ago. She does admit to anxiety and some depression. She is social with staff and peers. At 2115, patient received Tylenol and Trazodone for back pain and sleep respectively, with minimal results from either. At 2319 patient was given Oxycodone for continued back and right hip pain. At 0135 patient was given Luminol for anxiety.

## 2021-04-10 NOTE — PROGRESS NOTES
Problem: Chemical Dependency (Adult/Pediatric)  Goal: *STG: Remains safe in hospital  Outcome: Progressing Towards Goal    Report received from off going nurse, assumed care of resident. Pt is alert and oriented x 4 she is calm and cooperative. Pt is medication and meal compliant. She interacts well with both peers and staff. Pt denies SI/HI, AVH. Pt endorses both anxiety and depression due to not being able to take meds as frequently as she does at home. It was explained that medications are scheduled and that we follow guidelines for dosing. Pt then requested her PRN medications. Pt will be monitored for issues and to assure that care needs are met.

## 2021-04-11 PROCEDURE — 74011250637 HC RX REV CODE- 250/637: Performed by: NURSE PRACTITIONER

## 2021-04-11 PROCEDURE — 74011250637 HC RX REV CODE- 250/637: Performed by: PSYCHIATRY & NEUROLOGY

## 2021-04-11 PROCEDURE — 65220000003 HC RM SEMIPRIVATE PSYCH

## 2021-04-11 RX ADMIN — PANTOPRAZOLE SODIUM 40 MG: 40 TABLET, DELAYED RELEASE ORAL at 16:08

## 2021-04-11 RX ADMIN — ZIPRASIDONE HYDROCHLORIDE 60 MG: 20 CAPSULE ORAL at 16:07

## 2021-04-11 RX ADMIN — FERROUS SULFATE TAB 325 MG (65 MG ELEMENTAL FE) 650 MG: 325 (65 FE) TAB at 05:51

## 2021-04-11 RX ADMIN — OXYCODONE HYDROCHLORIDE 5 MG: 5 TABLET ORAL at 12:32

## 2021-04-11 RX ADMIN — OLANZAPINE 5 MG: 5 TABLET, FILM COATED ORAL at 04:39

## 2021-04-11 RX ADMIN — ACETAMINOPHEN 650 MG: 325 TABLET ORAL at 17:08

## 2021-04-11 RX ADMIN — PREGABALIN 75 MG: 75 CAPSULE ORAL at 08:37

## 2021-04-11 RX ADMIN — OXYCODONE HYDROCHLORIDE 5 MG: 5 TABLET ORAL at 05:51

## 2021-04-11 RX ADMIN — TRAZODONE HYDROCHLORIDE 50 MG: 50 TABLET ORAL at 21:31

## 2021-04-11 RX ADMIN — Medication 100 MG: at 08:35

## 2021-04-11 RX ADMIN — SUCRALFATE 1 G: 1 TABLET ORAL at 08:35

## 2021-04-11 RX ADMIN — SUCRALFATE 1 G: 1 TABLET ORAL at 11:19

## 2021-04-11 RX ADMIN — ZIPRASIDONE HYDROCHLORIDE 60 MG: 20 CAPSULE ORAL at 08:36

## 2021-04-11 RX ADMIN — ACETAMINOPHEN 650 MG: 325 TABLET ORAL at 04:39

## 2021-04-11 RX ADMIN — OLANZAPINE 5 MG: 5 TABLET, FILM COATED ORAL at 21:31

## 2021-04-11 RX ADMIN — FOLIC ACID 1 MG: 1 TABLET ORAL at 08:35

## 2021-04-11 RX ADMIN — SUCRALFATE 1 G: 1 TABLET ORAL at 21:31

## 2021-04-11 RX ADMIN — PAROXETINE HYDROCHLORIDE 20 MG: 20 TABLET, FILM COATED ORAL at 08:37

## 2021-04-11 RX ADMIN — PREGABALIN 75 MG: 75 CAPSULE ORAL at 16:08

## 2021-04-11 RX ADMIN — PHENOBARBITAL 16.2 MG: 32.4 TABLET ORAL at 08:35

## 2021-04-11 RX ADMIN — SUCRALFATE 1 G: 1 TABLET ORAL at 16:08

## 2021-04-11 RX ADMIN — HYDROXYZINE HYDROCHLORIDE 50 MG: 25 TABLET, FILM COATED ORAL at 08:37

## 2021-04-11 RX ADMIN — THERA TABS 1 TABLET: TAB at 08:36

## 2021-04-11 RX ADMIN — HYDROXYZINE HYDROCHLORIDE 50 MG: 25 TABLET, FILM COATED ORAL at 16:08

## 2021-04-11 RX ADMIN — ACETAMINOPHEN 650 MG: 325 TABLET ORAL at 11:18

## 2021-04-11 RX ADMIN — OLANZAPINE 5 MG: 5 TABLET, FILM COATED ORAL at 11:20

## 2021-04-11 RX ADMIN — PHENOBARBITAL 16.2 MG: 32.4 TABLET ORAL at 17:06

## 2021-04-11 RX ADMIN — HYDROXYZINE HYDROCHLORIDE 50 MG: 25 TABLET, FILM COATED ORAL at 11:19

## 2021-04-11 RX ADMIN — OXYCODONE HYDROCHLORIDE 5 MG: 5 TABLET ORAL at 18:34

## 2021-04-11 RX ADMIN — PANTOPRAZOLE SODIUM 40 MG: 40 TABLET, DELAYED RELEASE ORAL at 08:35

## 2021-04-11 RX ADMIN — ACETAMINOPHEN 650 MG: 325 TABLET ORAL at 21:31

## 2021-04-11 NOTE — GROUP NOTE
IP  GROUP DOCUMENTATION INDIVIDUAL Group Therapy Note Date: 4/10/2021 Group Start Time: 2000 Group End Time: 2100 Group Topic: Reflection/Relaxation [facility] - [address] Jose Raul Doyle RN 
 
[facility] GROUP DOCUMENTATION GROUP Group Therapy Note Attendees: There were a total of seven patients that attended the mental health scale teachings, activity drawing,aromatherapy and deep breathing exercise group therapy. Attendance: Attended Patient's Goal:  Reflection and Relaxation Interventions/techniques: Art integration, Informed, Promoted peer support, Reinforced, Role playing and Supported Follows Directions: Followed directions Interactions: Interacted appropriately Mental Status: Depressed Behavior/appearance: Cooperative Goals Achieved: Able to listen to others, Able to give feedback to another, Able to manage/cope with feelings, Able to receive feedback and Increased hopefulness Additional Notes:  Patient loved to talk about her dog and she allen the dog on her card and wrote the dog's name on the back of her card. Patient was able to support and encourage others in group therapy.  
 
Claritza Nair RN

## 2021-04-11 NOTE — BH NOTES
Assessment complete pt is alert and oriented x 3, there is no noted pain or discomfort at this time. Pt reports a poor  nights sleep. She remains calm and cooperative. She is noted to be a bit more withdrawn this morning. Pt reports depression but denies all anxiety, SI/HI, AVH. Medication and meal compliant. Monitoring continues to assure that care needs are met and that pt is kept safe.

## 2021-04-11 NOTE — PROGRESS NOTES
Problem: Chemical Dependency (Adult/Pediatric)  Goal: *STG: Remains safe in hospital  Outcome: Progressing Towards Goal  Goal: *STG: Complies with medication therapy  Outcome: Progressing Towards Goal

## 2021-04-11 NOTE — BH NOTES
PSYCHIATRIC PROGRESS NOTE         Patient Name  [address]   Date of Birth 1975   CSN [id number]   Medical Record Number  [id number]      Age  39 y.o. PCP Other, MD Grace   Admit date:  4/7/2021    Room Number  324/01  @ [facility]   Date of Service  4/11/2021         E & M PROGRESS NOTE:         HISTORY       CC:  \"psychosis / HI\"  HISTORY OF PRESENT ILLNESS/INTERVAL HISTORY:  (reviewed/updated 4/11/2021). per initial evaluation: The patient, Beverley Penn, is a 39 y.o. WHITE female with a past psychiatric history significant for bipolar disorder and etoh use disorder, who presents at this time with complaints of (and/or evidence of) the following emotional symptoms: psychotic behavior and servando. Additional symptomatology include homicidal ideation. The above symptoms have been present for 2+ weeks. These symptoms are of moderate to high severity. These symptoms are constant in nature. The patient's condition has been precipitated by psychosocial stressors. Patient's condition made worse by continued psychoactive drug use and alcohol use as well as treatment noncompliance. UDS: +THC; BAL=311 at admission.     Per documentation, patient has been acting more erratically at home, and recently threatening to kill her  by cutting his throat. She has been using combinations of medicinal marijuana and pain medication as well as up to a liter of alcohol daily.      The patient is an unreliable historian. The patient corroborates the above narrative. The patient contracts for safety on the unit and gives consent for the team to contact collateral. The patient is amenable to initiating treatment while on the unit. The patient reports that she is feeling physically debilitated and acknowledges alcohol withdrawal symptoms. Patient denies active thoughts of self harm.  Education provided regarding the deleterious effects of combining psychoactive drugs with alcohol and painkillers, patient voiced understanding. 4/09 - patient slept 8 hours overnight, she reports high anxiety (\"10/10\") and is reporting severe and chronic pain, which she states is not helped by NSAIDs (allergy) or Tylenol (ineffective). Patient denies SI/HI/AVH, declines rehab placement. Per nursing, patient was served with a protective order yesterday and cannot return home upon discharge. SW working on a safe disposition plan. Patient irritable but cooperative with treatment thus far. No significant withdrawal symptoms noted, CIWA scores have been 0-6 and typically due to anxiety. Patient tolerating phenobarbital taper, no tremor or diaphoresis observed. 4/10/21  Janette reports feeling slightly better. However her anxiety remains \"high\". Says she is in [place] state of perpetual panic\" although she was quite relaxed and calm during the interview. Only used Vistaril twice yesterday and once today. I encouraged her to use it more often. Denies any AH or VH. She reports being on Paxil 60mg prior to admission and would like to go back on this. Has not taken it in several days. I discussed with her the increased risk of mood lability but she would like to restart this. Restarted on 20mg daily. 4/11/21  Janette reports feeling slightly better today. Says she felt \"relaxed\" after taking Paxil. Her anxiety has reduced after taking Vistaril scheduled. SLept better last night. Denies any SI or plan. Denies any AH or VH. Enquiring about discharge home and I encouraged her to discuss this with her treatment team tomorrow. Pleasant and calm during the interview. SIDE EFFECTS: (reviewed/updated 4/11/2021)  None reported or admitted to.      ALLERGIES:(reviewed/updated 4/11/2021)  Allergies   Allergen Reactions    Prednisone Shortness of Breath    Venom-Honey Bee Swelling      MEDICATIONS PRIOR TO ADMISSION:(reviewed/updated 4/11/2021)  Medications Prior to Admission   Medication Sig    oxyCODONE IR ([place]) 5 mg immediate release tablet Take 7.5 mg by mouth every six (6) hours as needed for Pain.  ferrous sulfate 325 mg (65 mg iron) tablet Take 650 mg by mouth Daily (before breakfast).  pantoprazole (PROTONIX) 40 mg tablet Take 40 mg by mouth two (2) times a day.  albuterol (PROVENTIL HFA, VENTOLIN HFA, PROAIR HFA) 90 mcg/actuation inhaler Take 2 Puffs by inhalation every four (4) hours as needed for Wheezing or Shortness of Breath.  sucralfate (CARAFATE) 1 gram tablet Take 1 g by mouth Before breakfast, lunch, dinner and at bedtime.  PARoxetine (PAXIL) 40 mg tablet Take 40 mg by mouth daily. Indications: bipolar depression    ziprasidone hcl (Geodon) 60 mg capsule Take 60 mg by mouth two (2) times daily (with meals).  zolpidem (Ambien) 10 mg tablet Take 10 mg by mouth nightly as needed for Sleep.  metoprolol tartrate (LOPRESSOR) 100 mg IR tablet Take 100 mg by mouth every twelve (12) hours. PAST MEDICAL HISTORY: Past medical history from the initial psychiatric evaluation has been reviewed (reviewed/updated 4/11/2021) with no additional updates (I asked patient and no additional past medical history provided). Past Medical History:   Diagnosis Date    Bipolar 1 disorder ([facility] [address]     Chronic back pain     Cyst near tailbone     Scoliosis    History reviewed. No pertinent surgical history. SOCIAL HISTORY: Social history from the initial psychiatric evaluation has been reviewed (reviewed/updated 4/11/2021) with no additional updates (I asked patient and no additional social history provided).    Social History     Socioeconomic History    Marital status: SINGLE     Spouse name: Not on file    Number of children: Not on file    Years of education: Not on file    Highest education level: Not on file   Occupational History    Not on file   Social Needs    Financial resource strain: Not on file    Food insecurity     Worry: Not on file     Inability: Not on file   [employer] needs Medical: Not on file     Non-medical: Not on file   Tobacco Use    Smoking status: Current Every Day Smoker     Types: Cigarettes   Substance and Sexual Activity    Alcohol use: Yes     Alcohol/week: 12.0 standard drinks     Types: 12 Shots of liquor per week     Comment: daily    Drug use: Not on file    Sexual activity: Not Currently   Lifestyle    Physical activity     Days per week: Not on file     Minutes per session: Not on file    Stress: Not on file   Relationships    Social connections     Talks on phone: Not on file     Gets together: Not on file     Attends Mandaeism service: Not on file     Active member of club or organization: Not on file     Attends meetings of clubs or organizations: Not on file     Relationship status: Not on file    Intimate partner violence     Fear of current or ex partner: Not on file     Emotionally abused: Not on file     Physically abused: Not on file     Forced sexual activity: Not on file   Other Topics Concern     Service Not Asked    Blood Transfusions Not Asked    Caffeine Concern Not Asked    Occupational Exposure Not Asked   [place] Hazards Not Asked    Sleep Concern Not Asked    Stress Concern Not Asked    Weight Concern Not Asked    Special Diet Not Asked    Back Care Not Asked    Exercise Not Asked    Bike Helmet Not Asked   [address] Not Asked    Self-Exams Not Asked   Social History Narrative    Not on file      FAMILY HISTORY: Family history from the initial psychiatric evaluation has been reviewed (reviewed/updated 4/11/2021) with no additional updates (I asked patient and no additional family history provided). History reviewed. No pertinent family history.     REVIEW OF SYSTEMS: (reviewed/updated 4/11/2021)  Appetite:no change from normal   Sleep: poor with DIMS (difficulty initiating & maintaining sleep)   All other Review of Systems: Negative except chronic pain per HPI         [address] EXAM (MSE):    MSE FINDINGS ARE WITHIN NORMAL LIMITS (WNL) UNLESS OTHERWISE STATED BELOW. ( ALL OF THE BELOW CATEGORIES OF THE MSE HAVE BEEN REVIEWED (reviewed 4/11/2021) AND UPDATED AS DEEMED APPROPRIATE )  General Presentation age appropriate, evasive and guarded   Orientation oriented to time, place and person   Vital Signs  See below (reviewed 4/11/2021); Vital Signs (BP, Pulse, & Temp) are within normal limits if not listed below. Gait and Station Stable/steady, no ataxia   Musculoskeletal System No extrapyramidal symptoms (EPS); no abnormal muscular movements or Tardive Dyskinesia (TD); muscle strength and tone are within normal limits   Language No aphasia or dysarthria   Speech:  normal volume and pressured   Thought Processes coherent; normal rate of thoughts; fair abstract reasoning/computation   Thought Associations goal directed   Thought Content preoccupations   Suicidal Ideations none   Homicidal Ideations contracts for safety   Mood:  irritable and labile    Affect:  constricted and increased in intensity   Memory recent  intact   Memory remote:  intact   Concentration/Attention:  intact   Fund of Knowledge average   Insight:  limited   Reliability fair   Judgment:  poor          VITALS:     Patient Vitals for the past 24 hrs:   Temp Pulse Resp BP SpO2   04/11/21 0724 97.9 °F (36.6 °C) 93 16 121/78 98 %   04/10/21 1940 98.1 °F (36.7 °C) 82 16 (!) 143/91 99 %     Wt Readings from Last 3 Encounters:   04/07/21 59 kg (130 lb)     Temp Readings from Last 3 Encounters:   04/11/21 97.9 °F (36.6 °C)     BP Readings from Last 3 Encounters:   04/11/21 121/78     Pulse Readings from Last 3 Encounters:   04/11/21 93            DATA     LABORATORY DATA:(reviewed/updated 4/11/2021)  No results found for this or any previous visit (from the past 24 hour(s)).   No results found for: VALF2, VALAC, VALP, VALPR, DS6, CRBAM, CRBAMP, CARB2, XCRBAM  No results found for: [address] [place] [address] Radha Muñoz REPORTS:(reviewed/updated 4/11/2021)  No results found.        MEDICATIONS     ALL MEDICATIONS:   Current Facility-Administered Medications   Medication Dose Route Frequency    PARoxetine (PAXIL) tablet 20 mg  20 mg Oral DAILY    hydrOXYzine HCL (ATARAX) tablet 50 mg  50 mg Oral TID    oxyCODONE IR (ROXICODONE) tablet 5 mg  5 mg Oral Q6H PRN    acetaminophen (TYLENOL) tablet 650 mg  650 mg Oral Q4H PRN    ondansetron (ZOFRAN ODT) tablet 4 mg  4 mg Oral Q6H PRN    ondansetron (ZOFRAN) injection 4 mg  4 mg IntraMUSCular Q8H PRN    cloNIDine HCL (CATAPRES) tablet 0.1 mg  0.1 mg Oral QID PRN    pregabalin (LYRICA) capsule 75 mg  75 mg Oral BID    sucralfate (CARAFATE) tablet 1 g  1 g Oral AC&HS    ferrous sulfate tablet 650 mg  650 mg Oral ACB    albuterol (PROVENTIL HFA, VENTOLIN HFA, PROAIR HFA) inhaler 2 Puff  2 Puff Inhalation Q4H PRN    pantoprazole (PROTONIX) tablet 40 mg  40 mg Oral ACB&D    PHENobarbitaL (LUMINAL) tablet 16.2 mg  16.2 mg Oral BID    PHENobarbitaL (LUMINAL) tablet 16.2 mg  16.2 mg Oral Q6H PRN    thiamine HCL (B-1) tablet 100 mg  100 mg Oral DAILY    folic acid (FOLVITE) tablet 1 mg  1 mg Oral DAILY    therapeutic multivitamin (THERAGRAN) tablet 1 Tab  1 Tab Oral DAILY    OLANZapine (ZyPREXA) tablet 5 mg  5 mg Oral Q6H PRN    haloperidol lactate (HALDOL) injection 5 mg  5 mg IntraMUSCular Q6H PRN    benztropine (COGENTIN) tablet 1 mg  1 mg Oral BID PRN    diphenhydrAMINE (BENADRYL) injection 50 mg  50 mg IntraMUSCular BID PRN    LORazepam (ATIVAN) injection 1 mg  1 mg IntraMUSCular Q4H PRN    traZODone (DESYREL) tablet 50 mg  50 mg Oral QHS PRN    magnesium hydroxide (MILK OF MAGNESIA) 400 mg/5 mL oral suspension 30 mL  30 mL Oral DAILY PRN    ibuprofen (MOTRIN) tablet 400 mg  400 mg Oral Q8H PRN    nicotine (NICODERM CQ) 21 mg/24 hr patch 1 Patch  1 Patch TransDERmal DAILY    ziprasidone (GEODON) capsule 60 mg  60 mg Oral BID WITH MEALS      SCHEDULED MEDICATIONS:   Current Facility-Administered Medications   Medication Dose Route Frequency    PARoxetine (PAXIL) tablet 20 mg  20 mg Oral DAILY    hydrOXYzine HCL (ATARAX) tablet 50 mg  50 mg Oral TID    pregabalin (LYRICA) capsule 75 mg  75 mg Oral BID    sucralfate (CARAFATE) tablet 1 g  1 g Oral AC&HS    ferrous sulfate tablet 650 mg  650 mg Oral ACB    pantoprazole (PROTONIX) tablet 40 mg  40 mg Oral ACB&D    PHENobarbitaL (LUMINAL) tablet 16.2 mg  16.2 mg Oral BID    thiamine HCL (B-1) tablet 100 mg  100 mg Oral DAILY    folic acid (FOLVITE) tablet 1 mg  1 mg Oral DAILY    therapeutic multivitamin (THERAGRAN) tablet 1 Tab  1 Tab Oral DAILY    nicotine (NICODERM CQ) 21 mg/24 hr patch 1 Patch  1 Patch TransDERmal DAILY    ziprasidone (GEODON) capsule 60 mg  60 mg Oral BID WITH MEALS          ASSESSMENT & PLAN     DIAGNOSES REQUIRING ACTIVE TREATMENT AND MONITORING: (reviewed/updated 4/11/2021)  Patient Active Hospital Problem List:   Bipolar disorder, curr episode mixed, severe, w/o psychotic features ([facility] [address] (4/8/2021)    Assessment: patient with ongoing mood lability, though much of her presentation can be attributed to polysubstance use, namely marijuana and alcohol which she has been using frequently. Treatment will focus on sobriety, stopping substances with significant psychiatric side effects, and encouraging sobriety.     Plan:  - DISCONTINUE medicinal marijuana due to psychosis  - CONTINUE Geodon 60 mg BID for bipolar disorder  - Restart Paxil 20mg daily.   - CONTINUE Lyrica 75 mg BID for pain control  - START Roxicodone 5 mg Q6H PRN #2 for moderate to severe pain  - Consider mood stabilizer  - DISCONTINUE Ambien due to polypharmacy  - CONTINUE EtOH withdrawal precautions  - CONTINUE Phenobarbital high dose taper (64 --> 16 mg)  - IGM therapy as tolerated  - Expand database / obtain collateral  - Dispo planning (rehab vs home)     In summary, Brody Shankar, is a 39 y.o.  female who presents with a severe exacerbation of the principal diagnosis of Bipolar disorder, curr episode mixed, severe, w/o psychotic features ([facility] [address]    Patient's condition is not improving. Patient requires continued inpatient hospitalization for further stabilization, safety monitoring and medication management. I will continue to coordinate the provision of individual, milieu, occupational, group, and substance abuse therapies to address target symptoms/diagnoses as deemed appropriate for the individual patient. A coordinated, multidisplinary treatment team round was conducted with the patient (this team consists of the nurse, psychiatric unit pharmacist,  and writer). Complete current electronic health record for patient has been reviewed today including consultant notes, ancillary staff notes, nurses and psychiatric tech notes. Suicide risk assessment completed and patient deemed to be of low risk for suicide at this time. The following regarding medications was addressed during rounds with patient:   the risks and benefits of the proposed medication. The patient was given the opportunity to ask questions. Informed consent given to the use of the above medications. Will continue to adjust psychiatric and non-psychiatric medications (see above \"medication\" section and orders section for details) as deemed appropriate & based upon diagnoses and response to treatment. I will continue to order blood tests/labs and diagnostic tests as deemed appropriate and review results as they become available (see orders for details and above listed lab/test results). I will order psychiatric records from previous [facility] hospitals to further elucidate the nature of patient's psychopathology and review once available. I will gather additional collateral information from friends, family and o/p treatment team to further elucidate the nature of patient's psychopathology and baselline level of psychiatric functioning.          I certify that this patient's inpatient psychiatric hospital services furnished since the previous certification were, and continue to be, required for treatment that could reasonably be expected to improve the patient's condition, or for diagnostic study, and that the patient continues to need, on a daily basis, active treatment furnished directly by or requiring the supervision of inpatient psychiatric facility personnel. In addition, the hospital records show that services furnished were intensive treatment services, admission or related services, or equivalent services.     EXPECTED DISCHARGE DATE/DAY: TBD     DISPOSITION: CSU vs rehab       Signed By:   Maryellen Zelaya MD  4/11/2021

## 2021-04-11 NOTE — PROGRESS NOTES
Problem: Chemical Dependency (Adult/Pediatric)  Goal: *STG: Participates in treatment plan  Outcome: Progressing Towards Goal  Goal: *STG: Remains safe in hospital  Outcome: Progressing Towards Goal  Goal: *STG: Seeks staff when symptoms of withdrawal increase  Outcome: Progressing Towards Goal  Goal: *STG: Complies with medication therapy  Outcome: Progressing Towards Goal  Goal: *STG: Attends activities and groups  Outcome: Progressing Towards Goal  Goal: *STG: Vital signs within defined limits  Outcome: Progressing Towards Goal     Problem: Chemical Dependency (Adult/Pediatric)  Goal: *STG: Participates in treatment plan  Outcome: Progressing Towards Goal  Goal: *STG: Remains safe in hospital  Outcome: Progressing Towards Goal

## 2021-04-11 NOTE — PROGRESS NOTES
[facility] has been dealing with anxiety this shift. She is alert oriented and speech is clear and appropriate. She was med compliant and had a snack. She denied SI, HI, AVH and pain. She attended group. She interacted well with staff and peers. She had PRNs for anxiety, back pain, and sleep. She had a nightmare and woke up extremely upset and tearful,  was given Zyprexa PRN for that, and Tylenol for continued backpain. She is now requesting a second dose of oxycodone an hour after being given tylenol for her back pain. She received Phenobarbital during the evening for feeling extreme anxiety as well. She is frequently asking for PRNs and gives anxiety and back pain as the reason for requesting the meds. Of note: her CIWA score was 4 at 2000, and 1 at 0435.

## 2021-04-12 PROCEDURE — 99232 SBSQ HOSP IP/OBS MODERATE 35: CPT | Performed by: PSYCHIATRY & NEUROLOGY

## 2021-04-12 PROCEDURE — 74011250637 HC RX REV CODE- 250/637: Performed by: PSYCHIATRY & NEUROLOGY

## 2021-04-12 PROCEDURE — 74011250637 HC RX REV CODE- 250/637: Performed by: NURSE PRACTITIONER

## 2021-04-12 PROCEDURE — 65220000003 HC RM SEMIPRIVATE PSYCH

## 2021-04-12 RX ADMIN — SUCRALFATE 1 G: 1 TABLET ORAL at 16:32

## 2021-04-12 RX ADMIN — OLANZAPINE 5 MG: 5 TABLET, FILM COATED ORAL at 21:34

## 2021-04-12 RX ADMIN — HYDROXYZINE HYDROCHLORIDE 50 MG: 25 TABLET, FILM COATED ORAL at 16:32

## 2021-04-12 RX ADMIN — FERROUS SULFATE TAB 325 MG (65 MG ELEMENTAL FE) 650 MG: 325 (65 FE) TAB at 06:55

## 2021-04-12 RX ADMIN — PAROXETINE HYDROCHLORIDE 20 MG: 20 TABLET, FILM COATED ORAL at 08:12

## 2021-04-12 RX ADMIN — PANTOPRAZOLE SODIUM 40 MG: 40 TABLET, DELAYED RELEASE ORAL at 08:12

## 2021-04-12 RX ADMIN — SUCRALFATE 1 G: 1 TABLET ORAL at 21:34

## 2021-04-12 RX ADMIN — OXYCODONE HYDROCHLORIDE 5 MG: 5 TABLET ORAL at 00:29

## 2021-04-12 RX ADMIN — SUCRALFATE 1 G: 1 TABLET ORAL at 08:12

## 2021-04-12 RX ADMIN — PREGABALIN 75 MG: 75 CAPSULE ORAL at 08:12

## 2021-04-12 RX ADMIN — PREGABALIN 75 MG: 75 CAPSULE ORAL at 16:32

## 2021-04-12 RX ADMIN — HYDROXYZINE HYDROCHLORIDE 50 MG: 25 TABLET, FILM COATED ORAL at 08:12

## 2021-04-12 RX ADMIN — Medication 100 MG: at 08:12

## 2021-04-12 RX ADMIN — TRAZODONE HYDROCHLORIDE 50 MG: 50 TABLET ORAL at 21:34

## 2021-04-12 RX ADMIN — OLANZAPINE 5 MG: 5 TABLET, FILM COATED ORAL at 16:33

## 2021-04-12 RX ADMIN — THERA TABS 1 TABLET: TAB at 08:12

## 2021-04-12 RX ADMIN — OLANZAPINE 5 MG: 5 TABLET, FILM COATED ORAL at 09:23

## 2021-04-12 RX ADMIN — OXYCODONE HYDROCHLORIDE 5 MG: 5 TABLET ORAL at 22:30

## 2021-04-12 RX ADMIN — OXYCODONE HYDROCHLORIDE 5 MG: 5 TABLET ORAL at 08:12

## 2021-04-12 RX ADMIN — ZIPRASIDONE HYDROCHLORIDE 60 MG: 20 CAPSULE ORAL at 16:32

## 2021-04-12 RX ADMIN — FOLIC ACID 1 MG: 1 TABLET ORAL at 08:12

## 2021-04-12 RX ADMIN — PANTOPRAZOLE SODIUM 40 MG: 40 TABLET, DELAYED RELEASE ORAL at 16:32

## 2021-04-12 RX ADMIN — ZIPRASIDONE HYDROCHLORIDE 60 MG: 20 CAPSULE ORAL at 08:12

## 2021-04-12 RX ADMIN — OXYCODONE HYDROCHLORIDE 5 MG: 5 TABLET ORAL at 14:16

## 2021-04-12 RX ADMIN — HYDROXYZINE HYDROCHLORIDE 50 MG: 25 TABLET, FILM COATED ORAL at 11:39

## 2021-04-12 RX ADMIN — ACETAMINOPHEN 650 MG: 325 TABLET ORAL at 06:55

## 2021-04-12 RX ADMIN — ACETAMINOPHEN 650 MG: 325 TABLET ORAL at 21:34

## 2021-04-12 RX ADMIN — SUCRALFATE 1 G: 1 TABLET ORAL at 11:39

## 2021-04-12 NOTE — BH NOTES
GROUP THERAPY PROGRESS NOTE    Patient is participating in a Coping Skills group. Group time:  45 minutes     Personal goal for participation: Discuss healthy vs unhealthy coping strategies to combat mental health challenges and symptoms    Goal orientation: Personal    Group therapy participation: active    Therapeutic interventions reviewed and discussed: Patients provided psychoeducation on healthy vs unhealthy coping mechanisms. Patients discussed the problems they are currently dealing with, identified unhealthy coping strategies they use, and discussed the consequences of these unhealthy strategies. Patients then identified and discussed how to incorporate new, healthy coping activities into daily routine and when symptoms of mental health increase. Patients identified barriers to using these healthy coping strategies and how to overcome these barriers. Impression of participation: Pt presented with depressed mood, clear speech, guarded at times, calm and cooperative, interacted appropriately with others. Pt completed worksheet, stated she tends to have rage/aggression to cope with feelings. Pt processed how this has impacted multiple areas of her life. Pt processed desire to use more healthy coping skills including talking about her feelings.      LAUREEN Boland

## 2021-04-12 NOTE — BH NOTES
Behavioral Health Treatment Team Note      Patient goal(s) for today: Take medications as prescribed and attend groups  Treatment team focus/goals: Detox and dispo planning  Progress note:  Anxious, more organized thought process, less disheveled and some insight into substance use. Protective order placed by . Pt reports she does not want to go to rehab at this time and is angry treatment team makes this recommendation. Pt's sister Sandra Kimball [phone] has not answered phone for SW or pt and number does not have a set up voicemail box.     LOS:  5                        Expected LOS: TBD     Financial concerns/prescription coverage: [employer] Medicare Part A&B  TDO  Date of last family contact:  Permission to call sister 4/9                                 Family requesting physician contact today: No  Discharge plan: Sisters? Guns in the home: Denies weapons. Outpatient provider(s):  To be linked.      Participating treatment team members: Amina Valencia MSW and Dr. Gloria Zuniga MD

## 2021-04-12 NOTE — PROGRESS NOTES
Justin De La Garza was pleasasnt and cooperative with unit routine. She was social with both staff and peers. She denied SI, HI, AVH and pain. She was med compliant and had a snack. She admits to depression and anxiety and received multiple prn's for anxiety, sleep, and back pain, reporting \"some\" relief.

## 2021-04-12 NOTE — PROGRESS NOTES
Problem: Chemical Dependency (Adult/Pediatric)  Goal: *STG: Participates in treatment plan  Outcome: Progressing Towards Goal  Goal: *STG: Remains safe in hospital  Outcome: Progressing Towards Goal  Goal: *STG: Complies with medication therapy  Outcome: Progressing Towards Goal  Goal: *STG: Attends activities and groups  Outcome: Progressing Towards Goal

## 2021-04-12 NOTE — BH NOTES
0700-Report received from Bladimir Benavides RN.  0800-1000-Patient has been flat and endorse increased depression on anxiety that is chronic in nature, however she denies SI/HI  She also denies Auditory or visual hallucination at this time. She  Has chronic pain in her lower back and right hip. See mar for medications at this time. 1000-1200-Patient met with the team  and had  behavior at crisis moment and threw the paper off the desk in the dayroom  Then yelling and screaming , she was escorted to her room and was able to self calm  At this time. 1200-1400-Patient in  dayroom no safety or  behavior issues noted at this time. 1400-1600-Patient  In dayroom talking and watching TV at this time.   1600-1800-Patient is calm and cooperative no issue noted at this  Time staff will continue to monitor

## 2021-04-12 NOTE — BH NOTES
GROUP THERAPY PROGRESS NOTE    Patient is participating in Process group. Group time: 45 minutes    Personal goal for participation: To try new coping skills    Goal orientation: Personal    Group therapy participation: active and disruptive    Therapeutic interventions reviewed and discussed: Group participating in a guided imagery activity designed for depression. Group members were asked to try to relax and follow along with the CD. Discussion of how each person felt during and after exercise. Group members also discussed alternative coping skills that they use currently and those that have not worked in the past.    Impression of participation: Irma Beard was present for the exercise. She reports feeling it was relaxing but that she is still having stress. She was called out of group to see the doctor. She came back a short while later and threw her glasses on the table and reported being upset. She shared that her psychiatrist gave her permission to use her phone but that the nursing staff will not let her. She became more and more upset and was not responding to redirection. She asked this writer to see if she could help but she continued to escalate and started yelling and knocking things off the tables including papers and a poster board. Dayroom was cleared and staff came to help patient calm down. Staff was informed of the potential reason for escalation and was going to try to help her with her request to talk to her sister.     Chely Banuelos [facility]

## 2021-04-12 NOTE — PROGRESS NOTES
Problem: Chemical Dependency (Adult/Pediatric)  Goal: *STG: Participates in treatment plan  4/12/2021 0036 by Mario Fees  Outcome: Progressing Towards Goal  4/12/2021 0023 by Mario Fees  Outcome: Progressing Towards Goal  Goal: *STG: Remains safe in hospital  4/12/2021 0036 by Mario Fees  Outcome: Progressing Towards Goal  4/12/2021 0023 by Mario Fees  Outcome: Progressing Towards Goal  Goal: *STG: Seeks staff when symptoms of withdrawal increase  Outcome: Progressing Towards Goal  Goal: *STG: Complies with medication therapy  4/12/2021 0036 by Mario Fees  Outcome: Progressing Towards Goal  4/12/2021 0023 by Mario Fees  Outcome: Progressing Towards Goal  Goal: *STG: Attends activities and groups  4/12/2021 0036 by Mario Fees  Outcome: Progressing Towards Goal  4/12/2021 0023 by Mario Fees  Outcome: Progressing Towards Goal

## 2021-04-12 NOTE — BH NOTES
GROUP THERAPY PROGRESS NOTE    Patient is participating in Discharge Planning Group. Group time: 30 minutes    Personal goal for participation: Process feelings related to discharge and/or feelings/goals for today. Goal orientation: Personal    Group therapy participation: active    Therapeutic interventions reviewed and discussed: Group discussion was focused on discharge plans and anxiety related to this. Group members discussed what they planned to do once discharge and discharge plans. Patients discussed their goals for today and what they are working on with treatment team to get ready for discharge. Impression of participation: [address] was present in group. She shared that she has struggled with not being able to reach her sister due to her sister only answering phone calls from numbers she knows. Patient was upset and encouraged to see her treatment team and inquire if SW can been able to reach her sister and if there were other options to call her sister. Patient reports not sleeping well and being tired today due to noise on the unit. She denied any other issues after listening to group discussion and asking questions regarding discharge process. She shared her goal is to get the drain in her bathroom fixed and to reach her sister. Informed patient that this writer would put in a request for her drain.     Floydene Drain LPC LSATP [facility]

## 2021-04-12 NOTE — GROUP NOTE
NILESH  GROUP DOCUMENTATION INDIVIDUAL Group Therapy Note Date: 4/12/2021 Group Start Time: 1400 Group End Time: 1500 Group Topic: Recreational/Music Therapy [address] 3 ACUTE BEHAV [facility] [place], [address] GROUP DOCUMENTATION GROUP Group Therapy Note Attendees: 7 Attendance: Attended Patient's Goal:  To concentrate on selected task Interventions/techniques: Supported-crafts,games,music Follows Directions: Followed directions Interactions: Interacted appropriately Mental Status: Calm Behavior/appearance: Attentive, Cooperative and Needed prompting Goals Achieved: Able to engage in interactions and Able to listen to others Additional Notes:  Pleasant-engaged in conversation with peers and writer Parish Jernigan

## 2021-04-12 NOTE — SUICIDE SAFETY PLAN
SAFETY PLAN    A suicide Safety Plan is a document that supports someone when they are having thoughts of suicide. Warning Signs that indicate a suicidal crisis may be developing: What (situations, thoughts, feelings, body sensations, behaviors, etc.) do you experience that lets you know you are beginning to think about suicide? 1. depression  2. hopelessness  3. Don't want to live anymore    Internal Coping Strategies:  What things can I do (relaxation techniques, hobbies, physical activities, etc.) to take my mind off my problems without contacting another person? 1. coloring  2. gardening  3. People and social settings that provide distraction: Who can I call or where can I go to distract me? 1. Name: Rosy Young Phone:   2. Name:  Phone:   3. Place:            4. Place:     People whom I can ask for help: Who can I call when I need help - for example, friends, family, clergy, someone else? 1. Name: Rosy Young               Phone:   2. Name:   Phone:   3. Name:  Phone:     Professionals or [address] [place] [address] I can contact during a crisis: Who can I call for help - for example, my doctor, my psychiatrist, my psychologist, a mental health provider, a suicide hotline? 1. Clinician Name: Lore Lawrence  NP Phone: [phone]      Clinician Pager or Emergency Contact #: [phone]    2. Clinician Name: Amilcar RICKW   Phone: [phone]      Clinician Pager or Emergency Contact #: [phone]    3. Suicide Prevention Lifeline: 8-760-521-TALK (3012)    4. [address] [place] [address] Emergency Services -  for example, [facility] suicide hotline, [facility] Hotline: [facility]      Emergency Services Address: [address] [place], [place]      Emergency Services Phone: [phone]    Making the environment safe:  How can I make my environment (house/apartment/living space) safer? For example, can I remove guns, medications, and other items? 1. Nothing to remove/ no safety concerns at this time  2.

## 2021-04-12 NOTE — GROUP NOTE
NILESH  GROUP DOCUMENTATION INDIVIDUAL Group Therapy Note Date: 4/12/2021 Group Start Time: 1000 Group End Time: 1100 Group Topic: Topic Group [facility] [place], [address] GROUP DOCUMENTATION GROUP Group Therapy Note Attendees: 4 Attendance: Attended Patient's Goal:  To identify positive and negative coping skills Interventions/techniques: Supported-discussion Follows Directions: Followed directions Interactions: Interacted appropriately Mental Status: Calm Behavior/appearance: Attentive, Cooperative and Needed prompting Goals Achieved: Able to engage in interactions, Able to listen to others and Discussed coping Additional Notes:   
 
Roney Stewart

## 2021-04-12 NOTE — PROGRESS NOTES
Problem: Chemical Dependency (Adult/Pediatric)  Goal: *STG: Remains safe in hospital  Outcome: Progressing Towards Goal

## 2021-04-13 VITALS
DIASTOLIC BLOOD PRESSURE: 86 MMHG | RESPIRATION RATE: 18 BRPM | HEART RATE: 106 BPM | OXYGEN SATURATION: 95 % | WEIGHT: 130 LBS | SYSTOLIC BLOOD PRESSURE: 134 MMHG | BODY MASS INDEX: 20.4 KG/M2 | TEMPERATURE: 97.8 F | HEIGHT: 67 IN

## 2021-04-13 PROCEDURE — 99239 HOSP IP/OBS DSCHRG MGMT >30: CPT | Performed by: PSYCHIATRY & NEUROLOGY

## 2021-04-13 PROCEDURE — 74011250637 HC RX REV CODE- 250/637: Performed by: PSYCHIATRY & NEUROLOGY

## 2021-04-13 PROCEDURE — 74011250637 HC RX REV CODE- 250/637: Performed by: NURSE PRACTITIONER

## 2021-04-13 RX ORDER — PREGABALIN 75 MG/1
75 CAPSULE ORAL 2 TIMES DAILY
Qty: 60 CAP | Refills: 1 | Status: SHIPPED | OUTPATIENT
Start: 2021-04-13

## 2021-04-13 RX ORDER — PAROXETINE HYDROCHLORIDE 20 MG/1
20 TABLET, FILM COATED ORAL DAILY
Qty: 30 TAB | Refills: 1 | Status: SHIPPED | OUTPATIENT
Start: 2021-04-14

## 2021-04-13 RX ORDER — ZIPRASIDONE HYDROCHLORIDE 60 MG/1
60 CAPSULE ORAL 2 TIMES DAILY WITH MEALS
Qty: 60 CAP | Refills: 1 | Status: SHIPPED | OUTPATIENT
Start: 2021-04-13

## 2021-04-13 RX ORDER — TRAZODONE HYDROCHLORIDE 50 MG/1
50 TABLET ORAL
Qty: 30 TAB | Refills: 1 | Status: SHIPPED | OUTPATIENT
Start: 2021-04-13

## 2021-04-13 RX ORDER — HYDROXYZINE 50 MG/1
50 TABLET, FILM COATED ORAL
Qty: 90 TAB | Refills: 1 | Status: SHIPPED | OUTPATIENT
Start: 2021-04-13 | End: 2021-06-12

## 2021-04-13 RX ADMIN — HYDROXYZINE HYDROCHLORIDE 50 MG: 25 TABLET, FILM COATED ORAL at 12:04

## 2021-04-13 RX ADMIN — OXYCODONE HYDROCHLORIDE 5 MG: 5 TABLET ORAL at 08:00

## 2021-04-13 RX ADMIN — SUCRALFATE 1 G: 1 TABLET ORAL at 08:15

## 2021-04-13 RX ADMIN — ACETAMINOPHEN 650 MG: 325 TABLET ORAL at 13:42

## 2021-04-13 RX ADMIN — PANTOPRAZOLE SODIUM 40 MG: 40 TABLET, DELAYED RELEASE ORAL at 08:16

## 2021-04-13 RX ADMIN — PREGABALIN 75 MG: 75 CAPSULE ORAL at 08:16

## 2021-04-13 RX ADMIN — ZIPRASIDONE HYDROCHLORIDE 60 MG: 20 CAPSULE ORAL at 08:15

## 2021-04-13 RX ADMIN — PAROXETINE HYDROCHLORIDE 20 MG: 20 TABLET, FILM COATED ORAL at 08:16

## 2021-04-13 RX ADMIN — OXYCODONE HYDROCHLORIDE 5 MG: 5 TABLET ORAL at 14:49

## 2021-04-13 RX ADMIN — HYDROXYZINE HYDROCHLORIDE 50 MG: 25 TABLET, FILM COATED ORAL at 08:15

## 2021-04-13 RX ADMIN — Medication 100 MG: at 08:15

## 2021-04-13 RX ADMIN — FERROUS SULFATE TAB 325 MG (65 MG ELEMENTAL FE) 650 MG: 325 (65 FE) TAB at 06:16

## 2021-04-13 RX ADMIN — THERA TABS 1 TABLET: TAB at 08:16

## 2021-04-13 RX ADMIN — SUCRALFATE 1 G: 1 TABLET ORAL at 12:05

## 2021-04-13 RX ADMIN — FOLIC ACID 1 MG: 1 TABLET ORAL at 08:16

## 2021-04-13 RX ADMIN — OLANZAPINE 5 MG: 5 TABLET, FILM COATED ORAL at 10:04

## 2021-04-13 RX ADMIN — ACETAMINOPHEN 650 MG: 325 TABLET ORAL at 06:16

## 2021-04-13 NOTE — BH NOTES
PSYCHIATRIC PROGRESS NOTE         Patient Name  [address]   Date of Birth 1975   CSN [id number]   Medical Record Number  [id number]      Age  39 y.o. PCP Other, MD Grace   Admit date:  4/7/2021    Room Number  324/01  @ [facility]   Date of Service  4/12/2021         E & M PROGRESS NOTE:         HISTORY       CC:  \"psychosis / HI\"  HISTORY OF PRESENT ILLNESS/INTERVAL HISTORY:  (reviewed/updated 4/12/2021). per initial evaluation: The patient, Beverley Penn, is a 39 y.o. WHITE female with a past psychiatric history significant for bipolar disorder and etoh use disorder, who presents at this time with complaints of (and/or evidence of) the following emotional symptoms: psychotic behavior and servando. Additional symptomatology include homicidal ideation. The above symptoms have been present for 2+ weeks. These symptoms are of moderate to high severity. These symptoms are constant in nature. The patient's condition has been precipitated by psychosocial stressors. Patient's condition made worse by continued psychoactive drug use and alcohol use as well as treatment noncompliance. UDS: +THC; BAL=311 at admission.     Per documentation, patient has been acting more erratically at home, and recently threatening to kill her  by cutting his throat. She has been using combinations of medicinal marijuana and pain medication as well as up to a liter of alcohol daily.      The patient is an unreliable historian. The patient corroborates the above narrative. The patient contracts for safety on the unit and gives consent for the team to contact collateral. The patient is amenable to initiating treatment while on the unit. The patient reports that she is feeling physically debilitated and acknowledges alcohol withdrawal symptoms. Patient denies active thoughts of self harm.  Education provided regarding the deleterious effects of combining psychoactive drugs with alcohol and painkillers, patient voiced understanding. 4/09 - patient slept 8 hours overnight, she reports high anxiety (\"10/10\") and is reporting severe and chronic pain, which she states is not helped by NSAIDs (allergy) or Tylenol (ineffective). Patient denies SI/HI/AVH, declines rehab placement. Per nursing, patient was served with a protective order yesterday and cannot return home upon discharge. SW working on a safe disposition plan. Patient irritable but cooperative with treatment thus far. No significant withdrawal symptoms noted, CIWA scores have been 0-6 and typically due to anxiety. Patient tolerating phenobarbital taper, no tremor or diaphoresis observed. 4/10/21 Janette reports feeling slightly better. However her anxiety remains \"high\". Says she is in [place] state of perpetual panic\" although she was quite relaxed and calm during the interview. Only used Vistaril twice yesterday and once today. I encouraged her to use it more often. Denies any AH or VH. She reports being on Paxil 60mg prior to admission and would like to go back on this. Has not taken it in several days. I discussed with her the increased risk of mood lability but she would like to restart this. Restarted on 20mg daily. 4/11/21 Janette reports feeling slightly better today. Says she felt \"relaxed\" after taking Paxil. Her anxiety has reduced after taking Vistaril scheduled. SLept better last night. Denies any SI or plan. Denies any AH or VH. Enquiring about discharge home and I encouraged her to discuss this with her treatment team tomorrow. Pleasant and calm during the interview. 4/12 - no acute overnight events. Patient has been visible; she slept 4.5 hours overnight. Patient c/o pain this morning, getting Oxycodone for ongoing chronic pain. Discussed alternatives including steroids injections and Lyrica (patient presently on Lyrica).  She denies SI/HI/AVH and is discharge focused; she wants to go to her sister's home upon discharge, though she is unable to get in touch with her. She declines rehab for alcohol use disorder and declines shelter placement stating she has no plans to do either. SW continuing to work on disposition plan with patient. SIDE EFFECTS: (reviewed/updated 4/12/2021)  None reported or admitted to. ALLERGIES:(reviewed/updated 4/12/2021)  Allergies   Allergen Reactions    Prednisone Shortness of Breath    Venom-Honey Bee Swelling      MEDICATIONS PRIOR TO ADMISSION:(reviewed/updated 4/12/2021)  Medications Prior to Admission   Medication Sig    oxyCODONE IR (ROXICODONE) 5 mg immediate release tablet Take 7.5 mg by mouth every six (6) hours as needed for Pain.  ferrous sulfate 325 mg (65 mg iron) tablet Take 650 mg by mouth Daily (before breakfast).  pantoprazole (PROTONIX) 40 mg tablet Take 40 mg by mouth two (2) times a day.  albuterol (PROVENTIL HFA, VENTOLIN HFA, PROAIR HFA) 90 mcg/actuation inhaler Take 2 Puffs by inhalation every four (4) hours as needed for Wheezing or Shortness of Breath.  sucralfate (CARAFATE) 1 gram tablet Take 1 g by mouth Before breakfast, lunch, dinner and at bedtime.  PARoxetine (PAXIL) 40 mg tablet Take 40 mg by mouth daily. Indications: bipolar depression    ziprasidone hcl (Geodon) 60 mg capsule Take 60 mg by mouth two (2) times daily (with meals).  zolpidem (Ambien) 10 mg tablet Take 10 mg by mouth nightly as needed for Sleep.  metoprolol tartrate (LOPRESSOR) 100 mg IR tablet Take 100 mg by mouth every twelve (12) hours. PAST MEDICAL HISTORY: Past medical history from the initial psychiatric evaluation has been reviewed (reviewed/updated 4/12/2021) with no additional updates (I asked patient and no additional past medical history provided). Past Medical History:   Diagnosis Date    Bipolar 1 disorder ([address]     Chronic back pain     Cyst near tailbone     Scoliosis    History reviewed. No pertinent surgical history.    SOCIAL HISTORY: Social history from the initial psychiatric evaluation has been reviewed (reviewed/updated 4/12/2021) with no additional updates (I asked patient and no additional social history provided). Social History     Socioeconomic History    Marital status: SINGLE     Spouse name: Not on file    Number of children: Not on file    Years of education: Not on file    Highest education level: Not on file   Occupational History    Not on file   Social Needs    Financial resource strain: Not on file    Food insecurity     Worry: Not on file     Inability: Not on file    Transportation needs     Medical: Not on file     Non-medical: Not on file   Tobacco Use    Smoking status: Current Every Day Smoker     Types: Cigarettes   Substance and Sexual Activity    Alcohol use:  Yes     Alcohol/week: 12.0 standard drinks     Types: 12 Shots of liquor per week     Comment: daily    Drug use: Not on file    Sexual activity: Not Currently   Lifestyle    Physical activity     Days per week: Not on file     Minutes per session: Not on file    Stress: Not on file   Relationships    Social connections     Talks on phone: Not on file     Gets together: Not on file     Attends Confucianist service: Not on file     Active member of club or organization: Not on file     Attends meetings of clubs or organizations: Not on file     Relationship status: Not on file    Intimate partner violence     Fear of current or ex partner: Not on file     Emotionally abused: Not on file     Physically abused: Not on file     Forced sexual activity: Not on file   Other Topics Concern     Service Not Asked    Blood Transfusions Not Asked    Caffeine Concern Not Asked    Occupational Exposure Not Asked   Donnita Munda Hazards Not Asked    Sleep Concern Not Asked    Stress Concern Not Asked    Weight Concern Not Asked    Special Diet Not Asked    Back Care Not Asked    Exercise Not Asked    Bike Helmet Not Asked   [address] Not Asked    Self-Exams Not Asked   Social History Narrative    Not on file      FAMILY HISTORY: Family history from the initial psychiatric evaluation has been reviewed (reviewed/updated 4/12/2021) with no additional updates (I asked patient and no additional family history provided). History reviewed. No pertinent family history. REVIEW OF SYSTEMS: (reviewed/updated 4/12/2021)  Appetite:no change from normal   Sleep: poor with DIMS (difficulty initiating & maintaining sleep)   All other Review of Systems: Negative except chronic pain per HPI         [facility] (MSE):    MSE FINDINGS ARE WITHIN NORMAL LIMITS (WNL) UNLESS OTHERWISE STATED BELOW. ( ALL OF THE BELOW CATEGORIES OF THE MSE HAVE BEEN REVIEWED (reviewed 4/12/2021) AND UPDATED AS DEEMED APPROPRIATE )  General Presentation age appropriate, evasive and guarded   Orientation oriented to time, place and person   Vital Signs  See below (reviewed 4/12/2021); Vital Signs (BP, Pulse, & Temp) are within normal limits if not listed below.    Gait and Station Stable/steady, no ataxia   Musculoskeletal System No extrapyramidal symptoms (EPS); no abnormal muscular movements or Tardive Dyskinesia (TD); muscle strength and tone are within normal limits   Language No aphasia or dysarthria   Speech:  normal volume and pressured   Thought Processes coherent; normal rate of thoughts; fair abstract reasoning/computation   Thought Associations goal directed   Thought Content preoccupations   Suicidal Ideations none   Homicidal Ideations contracts for safety   Mood:  irritable and labile    Affect:  constricted and increased in intensity   Memory recent  intact   Memory remote:  intact   Concentration/Attention:  intact   Fund of Knowledge average   Insight:  limited   Reliability fair   Judgment:  poor          VITALS:     Patient Vitals for the past 24 hrs:   Temp Pulse Resp BP SpO2   04/12/21 1954 98 °F (36.7 °C) 81 18 (!) 142/88 100 % 04/12/21 0748 98 °F (36.7 °C) 88 16 (!) 143/89 100 %     Wt Readings from Last 3 Encounters:   04/07/21 59 kg (130 lb)     Temp Readings from Last 3 Encounters:   04/12/21 98 °F (36.7 °C)     BP Readings from Last 3 Encounters:   04/12/21 (!) 142/88     Pulse Readings from Last 3 Encounters:   04/12/21 81            DATA     LABORATORY DATA:(reviewed/updated 4/12/2021)  No results found for this or any previous visit (from the past 24 hour(s)). No results found for: VALF2, VALAC, VALP, VALPR, DS6, CRBAM, CRBAMP, CARB2, XCRBAM  No results found for: LITHM   RADIOLOGY REPORTS:(reviewed/updated 4/12/2021)  No results found.        MEDICATIONS     ALL MEDICATIONS:   Current Facility-Administered Medications   Medication Dose Route Frequency    PARoxetine (PAXIL) tablet 20 mg  20 mg Oral DAILY    hydrOXYzine HCL (ATARAX) tablet 50 mg  50 mg Oral TID    oxyCODONE IR (ROXICODONE) tablet 5 mg  5 mg Oral Q6H PRN    acetaminophen (TYLENOL) tablet 650 mg  650 mg Oral Q4H PRN    ondansetron (ZOFRAN ODT) tablet 4 mg  4 mg Oral Q6H PRN    ondansetron (ZOFRAN) injection 4 mg  4 mg IntraMUSCular Q8H PRN    cloNIDine HCL (CATAPRES) tablet 0.1 mg  0.1 mg Oral QID PRN    pregabalin (LYRICA) capsule 75 mg  75 mg Oral BID    sucralfate (CARAFATE) tablet 1 g  1 g Oral AC&HS    ferrous sulfate tablet 650 mg  650 mg Oral ACB    albuterol (PROVENTIL HFA, VENTOLIN HFA, PROAIR HFA) inhaler 2 Puff  2 Puff Inhalation Q4H PRN    pantoprazole (PROTONIX) tablet 40 mg  40 mg Oral ACB&D    thiamine HCL (B-1) tablet 100 mg  100 mg Oral DAILY    folic acid (FOLVITE) tablet 1 mg  1 mg Oral DAILY    therapeutic multivitamin (THERAGRAN) tablet 1 Tab  1 Tab Oral DAILY    OLANZapine (ZyPREXA) tablet 5 mg  5 mg Oral Q6H PRN    haloperidol lactate (HALDOL) injection 5 mg  5 mg IntraMUSCular Q6H PRN    benztropine (COGENTIN) tablet 1 mg  1 mg Oral BID PRN    diphenhydrAMINE (BENADRYL) injection 50 mg  50 mg IntraMUSCular BID PRN    LORazepam (ATIVAN) injection 1 mg  1 mg IntraMUSCular Q4H PRN    traZODone (DESYREL) tablet 50 mg  50 mg Oral QHS PRN    magnesium hydroxide (MILK OF MAGNESIA) 400 mg/5 mL oral suspension 30 mL  30 mL Oral DAILY PRN    ibuprofen (MOTRIN) tablet 400 mg  400 mg Oral Q8H PRN    nicotine (NICODERM CQ) 21 mg/24 hr patch 1 Patch  1 Patch TransDERmal DAILY    ziprasidone (GEODON) capsule 60 mg  60 mg Oral BID WITH MEALS      SCHEDULED MEDICATIONS:   Current Facility-Administered Medications   Medication Dose Route Frequency    PARoxetine (PAXIL) tablet 20 mg  20 mg Oral DAILY    hydrOXYzine HCL (ATARAX) tablet 50 mg  50 mg Oral TID    pregabalin (LYRICA) capsule 75 mg  75 mg Oral BID    sucralfate (CARAFATE) tablet 1 g  1 g Oral AC&HS    ferrous sulfate tablet 650 mg  650 mg Oral ACB    pantoprazole (PROTONIX) tablet 40 mg  40 mg Oral ACB&D    thiamine HCL (B-1) tablet 100 mg  100 mg Oral DAILY    folic acid (FOLVITE) tablet 1 mg  1 mg Oral DAILY    therapeutic multivitamin (THERAGRAN) tablet 1 Tab  1 Tab Oral DAILY    nicotine (NICODERM CQ) 21 mg/24 hr patch 1 Patch  1 Patch TransDERmal DAILY    ziprasidone (GEODON) capsule 60 mg  60 mg Oral BID WITH MEALS          ASSESSMENT & PLAN     DIAGNOSES REQUIRING ACTIVE TREATMENT AND MONITORING: (reviewed/updated 4/12/2021)  Patient Active Hospital Problem List:   Bipolar disorder, curr episode mixed, severe, w/o psychotic features ([facility] (4/8/2021)    Assessment: patient with ongoing mood lability, though much of her presentation can be attributed to polysubstance use, namely marijuana and alcohol which she has been using frequently. Treatment will focus on sobriety, stopping substances with significant psychiatric side effects, and encouraging sobriety.     Plan:  - DISCONTINUE medicinal marijuana due to psychosis  - CONTINUE Geodon 60 mg BID for bipolar disorder  - Restart Paxil 20mg daily.   - CONTINUE Lyrica 75 mg BID for pain control  - CONTINUE Roxicodone 5 mg Q6H PRN #2 for moderate to severe pain  - Consider mood stabilizer  - DISCONTINUE Ambien due to polypharmacy  - CONTINUE EtOH withdrawal precautions  - CONTINUE Phenobarbital high dose taper (64 --> 16 mg)  - IGM therapy as tolerated  - Expand database / obtain collateral  - Dispo planning (rehab vs home)     In summary, Doug Holman, is a 39 y.o.  female who presents with a severe exacerbation of the principal diagnosis of Bipolar disorder, curr episode mixed, severe, w/o psychotic features ([facility] [address]    Patient's condition is not improving. Patient requires continued inpatient hospitalization for further stabilization, safety monitoring and medication management. I will continue to coordinate the provision of individual, milieu, occupational, group, and substance abuse therapies to address target symptoms/diagnoses as deemed appropriate for the individual patient. A coordinated, multidisplinary treatment team round was conducted with the patient (this team consists of the nurse, psychiatric unit pharmacist,  and writer). Complete current electronic health record for patient has been reviewed today including consultant notes, ancillary staff notes, nurses and psychiatric tech notes. Suicide risk assessment completed and patient deemed to be of low risk for suicide at this time. The following regarding medications was addressed during rounds with patient:   the risks and benefits of the proposed medication. The patient was given the opportunity to ask questions. Informed consent given to the use of the above medications. Will continue to adjust psychiatric and non-psychiatric medications (see above \"medication\" section and orders section for details) as deemed appropriate & based upon diagnoses and response to treatment.      I will continue to order blood tests/labs and diagnostic tests as deemed appropriate and review results as they become available (see orders for details and above listed lab/test results). I will order psychiatric records from previous [facility] hospitals to further elucidate the nature of patient's psychopathology and review once available. I will gather additional collateral information from friends, family and o/p treatment team to further elucidate the nature of patient's psychopathology and baselline level of psychiatric functioning. I certify that this patient's inpatient psychiatric hospital services furnished since the previous certification were, and continue to be, required for treatment that could reasonably be expected to improve the patient's condition, or for diagnostic study, and that the patient continues to need, on a daily basis, active treatment furnished directly by or requiring the supervision of inpatient psychiatric facility personnel. In addition, the hospital records show that services furnished were intensive treatment services, admission or related services, or equivalent services.     EXPECTED DISCHARGE DATE/DAY: TBD     DISPOSITION:home vs self care       Signed By:   Abigail Coombs MD  4/12/2021

## 2021-04-13 NOTE — PROGRESS NOTES
Problem: Chemical Dependency (Adult/Pediatric)  Goal: *STG: Participates in treatment plan  Outcome: Progressing Towards Goal     Problem: Chemical Dependency (Adult/Pediatric)  Goal: *STG: Complies with medication therapy  Outcome: Progressing Towards Goal  Goal: *STG: Attends activities and groups  Outcome: Progressing Towards Goal      0700: Shift change report given to Mae LAWLER (oncoming nurse) by Brittany Weller (offgoing nurse). Report included the following information SBAR, Kardex, MAR and Recent Results. 6273-3235: Assumed care of patient. Patient c/o back and hip pain rated at 10/10. PRN oxycodone given at 0800. Reassessed pain, patient stated pain is 6/10. Patient endorsed anxiety 3/10. Denied depression, SI, HI, AVH. Patient med and meal compliant. Patient to dayroom for meals and snacks. Patient interacted appropriately with staff and peers. 2721-0774: PRN Zyprexa given at 1004. Patient rated anxiety 7/10.    0079-8013: Patient to dayroom for meals. Patient c/o back and hip pain rated at 9/10 , given Tylenol at 1342.     6557-9545: Patient given oxycodone at 66 65 76 for back and hip pain rated at 8/10. Patient given discharge instructions. Patient discharged.

## 2021-04-13 NOTE — BH NOTES
Behavioral Health Transition Record to Provider    Patient Name: Estephanie Castro  YOB: 1975  Medical Record Number: [id number]  Date of Admission: 4/7/2021  Date of Discharge: 4/13/2021    Attending Provider: Mesha Blount, *  Discharging Provider: Ever Herring MD  To contact this individual call [phone]  and ask the  to page. If unavailable, ask to be transferred to [facility] Provider on call. [facility] Provider will be available on call 24/7 and during holidays. Primary Care Provider: Shante, MD Grace    Allergies   Allergen Reactions    Prednisone Shortness of Breath    Venom-Honey Bee Swelling       Reason for Admission: Psychosis     Admission Diagnosis: [facility] disease, [facility] [F31.10]    * No surgery found *    Results for orders placed or performed during the hospital encounter of 04/07/21   COVID-19 WITH INFLUENZA A/B   Result Value Ref Range    SARS-CoV-2 Not detected NOTD      Influenza A by PCR Not detected NOTD      Influenza B by PCR Not detected NOTD     ETHYL ALCOHOL   Result Value Ref Range    ALCOHOL(ETHYL),SERUM 311 (H) <10 MG/DL   DRUG SCREEN, URINE   Result Value Ref Range    AMPHETAMINES Negative NEG      BARBITURATES Negative NEG      BENZODIAZEPINES Negative NEG      COCAINE Negative NEG      METHADONE Negative NEG      OPIATES Negative NEG      PCP(PHENCYCLIDINE) Negative NEG      THC (TH-CANNABINOL) Positive (A) NEG      Drug screen comment (NOTE)    SAMPLES BEING HELD   Result Value Ref Range    SAMPLES BEING HELD SST,PST,BLUE,LAV     COMMENT        Add-on orders for these samples will be processed based on acceptable specimen integrity and analyte stability, which may vary by analyte.    ETHYL ALCOHOL   Result Value Ref Range    ALCOHOL(ETHYL),SERUM 110 (H) <10 MG/DL   TSH 3RD GENERATION   Result Value Ref Range    TSH 0.66 0.36 - 3.74 uIU/mL   LIPID PANEL   Result Value Ref Range    LIPID PROFILE Cholesterol, total 150 <200 MG/DL    Triglyceride 71 <150 MG/DL    HDL Cholesterol 80 MG/DL    LDL, calculated 55.8 0 - 100 MG/DL    VLDL, calculated 14.2 MG/DL    CHOL/HDL Ratio 1.9 0.0 - 5.0     HEMOGLOBIN A1C WITH EAG   Result Value Ref Range    Hemoglobin A1c 4.7 4.0 - 5.6 %    Est. average glucose 88 mg/dL   CBC W/O DIFF   Result Value Ref Range    WBC 4.3 3.6 - 11.0 K/uL    RBC 3.99 3.80 - 5.20 M/uL    HGB 9.4 (L) 11.5 - 16.0 g/dL    HCT 30.7 (L) 35.0 - 47.0 %    MCV 76.9 (L) 80.0 - 99.0 FL    MCH 23.6 (L) 26.0 - 34.0 PG    MCHC 30.6 30.0 - 36.5 g/dL    RDW 23.9 (H) 11.5 - 14.5 %    PLATELET 128 159 - 097 K/uL    MPV 9.3 8.9 - 12.9 FL    NRBC 0.0 0  WBC    ABSOLUTE NRBC 0.00 0.00 - 0.01 K/uL   GGT   Result Value Ref Range    GGT 28 5 - 55 U/L   METABOLIC PANEL, COMPREHENSIVE   Result Value Ref Range    Sodium 142 136 - 145 mmol/L    Potassium 3.2 (L) 3.5 - 5.1 mmol/L    Chloride 104 97 - 108 mmol/L    CO2 28 21 - 32 mmol/L    Anion gap 10 5 - 15 mmol/L    Glucose 89 65 - 100 mg/dL    BUN 7 6 - 20 MG/DL    Creatinine 0.59 0.55 - 1.02 MG/DL    BUN/Creatinine ratio 12 12 - 20      GFR est AA >60 >60 ml/min/1.73m2    GFR est non-AA >60 >60 ml/min/1.73m2    Calcium 8.4 (L) 8.5 - 10.1 MG/DL    Bilirubin, total 0.4 0.2 - 1.0 MG/DL    ALT (SGPT) 16 12 - 78 U/L    AST (SGOT) 23 15 - 37 U/L    Alk. phosphatase 65 45 - 117 U/L    Protein, total 7.0 6.4 - 8.2 g/dL    Albumin 3.2 (L) 3.5 - 5.0 g/dL    Globulin 3.8 2.0 - 4.0 g/dL    A-G Ratio 0.8 (L) 1.1 - 2.2     HCG URINE, QL. - POC   Result Value Ref Range    Pregnancy test,urine (POC) Negative NEG         Immunizations administered during this encounter: There is no immunization history on file for this patient.     Screening for Metabolic Disorders for Patients on Antipsychotic Medications  (Data obtained from the EMR)    Estimated Body Mass Index  Estimated body mass index is 20.36 kg/m² as calculated from the following:    Height as of this encounter: 5' 7\" (1.702 m). Weight as of this encounter: 59 kg (130 lb). Vital Signs/Blood Pressure  Visit Vitals  /86   Pulse (!) 106   Temp 97.8 °F (36.6 °C)   Resp 18   Ht 5' 7\" (1.702 m)   Wt 59 kg (130 lb)   LMP 04/05/2021 (Approximate)   SpO2 95%   Breastfeeding No   BMI 20.36 kg/m²       Blood Glucose/Hemoglobin A1c  Lab Results   Component Value Date/Time    Glucose 89 04/09/2021 04:51 AM       Lab Results   Component Value Date/Time    Hemoglobin A1c 4.7 04/09/2021 04:51 AM        Lipid Panel  Lab Results   Component Value Date/Time    Cholesterol, total 150 04/09/2021 04:51 AM    HDL Cholesterol 80 04/09/2021 04:51 AM    LDL, calculated 55.8 04/09/2021 04:51 AM    Triglyceride 71 04/09/2021 04:51 AM    CHOL/HDL Ratio 1.9 04/09/2021 04:51 AM        Discharge Diagnosis: Please refer to physician's discharge summary. Discharge Plan:   The patient Vicci Coppell exhibits the ability to control behavior in a less restrictive environment. Patient's level of functioning is improving. No assaultive/destructive behavior has been observed for the past 24 hours. No suicidal/homicidal threat or behavior has been observed for the past 24 hours. There is no evidence of serious medication side effects. Patient has not been in physical or protective restraints for at least the past 24 hours. Discharge Medication List and Instructions:   Current Discharge Medication List          Unresulted Labs (24h ago, onward)    None        To obtain results of studies pending at discharge, please contact N/A. Follow-up Information     Follow up With Specialties Details Why [address] [facility] [address] [facility]  Call on 4/14/2021 Please call Rapid Access phone line [phone] between 8:00AM -2:00PM to complete intake assessment for ongoing mental health case management, therapy and medication management.   [facility]  Address: [address] , [place], [address] [address]  Phone: [phone]  [id number] at [phone] or [phone]          Advanced Directive:   Does the patient have an appointed surrogate decision maker? Unknown   Does the patient have a Medical Advance Directive? Unknown   Does the patient have a Psychiatric Advance Directive? Unknown   If the patient does not have a surrogate or Medical Advance Directive AND Psychiatric Advance Directive, the patient was offered information on these advance directives. Unknown     Patient Instructions: Please continue all medications until otherwise directed by physician. Tobacco Cessation Discharge Plan:   Is the patient a smoker and needs referral for smoking cessation? No  Patient referred to the following for smoking cessation with an appointment? No   Patient was offered medication to assist with smoking cessation at discharge? No  Was education for smoking cessation added to the discharge instructions? No     Alcohol/Substance Abuse Discharge Plan:   Does the patient have a history of substance/alcohol abuse and requires a referral for treatment? Yes  Patient referred to the following for substance/alcohol abuse treatment with an appointment? Yes  Patient was offered medication to assist with alcohol cessation at discharge? No  Was education for substance/alcohol abuse added to discharge instructions? Yes     Patient discharged to Home; provided to the patient/caregiver either in hard copy or electronically. Continuing care paperwork was faxed to community mental health providers.

## 2021-04-13 NOTE — GROUP NOTE
NILESH  GROUP DOCUMENTATION INDIVIDUAL Group Therapy Note Date: 4/13/2021 Group Start Time: 1000 Group End Time: 1100 Group Topic: Topic Group [facility] 3 ACUTE BEHAV [facility], [address] GROUP DOCUMENTATION GROUP Group Therapy Note Attendees: 8 Attendance: Attended Patient's Goal:  To participate in relaxation activity Interventions/techniques: Supported-art Follows Directions: Followed directions Interactions: Interacted appropriately Mental Status: Calm Behavior/appearance: Attentive Goals Achieved: Able to engage in interactions and Able to listen to others Additional Notes:   
 
Roney Stewart

## 2021-04-13 NOTE — PROGRESS NOTES
Problem: Chemical Dependency (Adult/Pediatric)  Goal: *STG: Participates in treatment plan  Outcome: Progressing Towards Goal  Goal: *STG: Remains safe in hospital  Outcome: Progressing Towards Goal  Goal: *STG: Seeks staff when symptoms of withdrawal increase  Outcome: Progressing Towards Goal  Goal: *STG: Complies with medication therapy  Outcome: Progressing Towards Goal  Goal: *STG: Maintains appropriate nutrition and hydration  Outcome: Progressing Towards Goal  Goal: *STG: Vital signs within defined limits  Outcome: Progressing Towards Goal     Problem: Chemical Dependency (Adult/Pediatric)  Goal: *STG: Participates in treatment plan  Outcome: Progressing Towards Goal  Goal: *STG: Remains safe in hospital  Outcome: Progressing Towards Goal  Goal: *STG: Seeks staff when symptoms of withdrawal increase  Outcome: Progressing Towards Goal  Goal: *STG: Complies with medication therapy  Outcome: Progressing Towards Goal  Goal: *STG: Vital signs within defined limits  Outcome: Progressing Towards Goal     Problem: Falls - Risk of  Goal: *Absence of Falls  Description: Document Mar[place] Fall Risk and appropriate interventions in the flowsheet. Outcome: Progressing Towards Goal  Note: Fall Risk Interventions:            Medication Interventions: Teach patient to arise slowly    Elimination Interventions:  Toilet paper/wipes in reach

## 2021-04-13 NOTE — DISCHARGE INSTRUCTIONS
Patient Education       Patient Education    If I feel I am at risk of hurting myself or others, I will call the crisis office and speak with a crisis worker who will assist me during my crisis. [address]  [phone]  [facility] [place] [address] [phone]  [address] [facility]-  [phone]  [facility]  [phone]  [place] [facility]-  [phone]    Bipolar Disorder: Care Instructions  Your Care Instructions     Bipolar disorder is an illness that causes extreme mood changes, from times of very high energy (manic episodes) to times of depression. But many people with bipolar disorder show only the symptoms of depression. These moods may cause problems with your work, school, family life, friendships, and how well you function. This disease is also called manic-depression. There is no cure for bipolar disorder, but it can be helped with medicines. Counseling may also help. It is important to take your medicines exactly as prescribed, even when you feel well. You may need lifelong treatment. Follow-up care is a key part of your treatment and safety. Be sure to make and go to all appointments, and call your doctor if you are having problems. It's also a good idea to know your test results and keep a list of the medicines you take. How can you care for yourself at home? · Be safe with medicines. Take your medicines exactly as prescribed. Do not stop or change a medicine without talking to your doctor first. Trav Alberto and your doctor may need to try different combinations of medicines to find what works for you. · Take your medicines on schedule to keep your moods even. When you feel good, you may think that you do not need your medicines. But it is important to keep taking them. · Go to your counseling sessions. Call and talk with your counselor if you can't go to a session or if you don't think the sessions are helping.  Do not just stop going.  · Get at least 30 minutes of activity on most days of the week. Walking is a good choice. You also may want to do other things, such as running, swimming, or cycling. · Get enough sleep. Keep your room dark and quiet. Try to go to bed at the same time every night. · Eat a healthy diet. This includes whole grains, dairy, fruits, vegetables, and protein. Eat foods from each of these groups. · Try to lower your stress. Manage your time, build a strong support system, and lead a healthy lifestyle. To lower your stress, try physical activity, slow deep breathing, or getting a massage. · Do not use alcohol, marijuana, or illegal drugs. · Learn the early signs of your mood changes. You can then take steps to help yourself feel better. · Ask for help from friends and family when you need it. You may need help with daily chores when you are depressed. When you are manic, you may need support to control your high energy levels. What should you do if someone in your family has bipolar disorder? · Learn about the disease and signs it's getting worse. · Remind your family member you love them. · Make a plan with all family members about how to take care of your loved one when symptoms are bad. · Remind yourself it will take time for changes to occur. · Try not to blame yourself for the disease. · Know your legal rights and the legal rights of your family member. Support groups or counselors can help with this information. · Take care of yourself. Keep up with your interests, such as career, hobbies, and friends. Use exercise, positive self-talk, deep breathing, and other relaxing exercises to help lower your stress. · Give yourself time to grieve. You may need to deal with emotions such as anger, fear, and frustration. · If you are having a hard time with your feelings or with your relationship with your family member, talk with a counselor. When should you call for help?    Call 911 anytime you think you may need emergency care. For example, call if:    · You feel like hurting yourself or someone else.     · Someone who has bipolar disorder displays dangerous behavior, and you think the person might hurt himself or herself or someone else. Call your doctor now or seek immediate medical care if:    · You hear voices.     · Someone you know has bipolar disorder and talks about suicide. Keep the numbers for these national suicide hotlines: 9-199-978-TALK (1-249.209.5952) and 9-710-HPXVPUM (7-364.136.3276). If a suicide threat seems real, with a specific plan and a way to carry it out, stay with the person, or ask someone you trust to stay with the person, until you can get help.     · Someone you know has bipolar disorder and:  ? Starts to give away possessions. ? Is using illegal drugs or drinking alcohol heavily. ? Talks or writes about death, including writing suicide notes or talking about guns, knives, or pills. ? Talks or writes about hurting someone else. ? Starts to spend a lot of time alone. ? Acts very aggressively or suddenly appears calm. ? Talks about beliefs that are not based in reality (delusions). Watch closely for changes in your health, and be sure to contact your doctor if:    · You cannot go to your counseling sessions. Where can you learn more? Go to http://genaro-janette.info/  Enter K052 in the search box to learn more about \"Bipolar Disorder: Care Instructions. \"  Current as of: September 23, 2020               Content Version: 12.8  © 2006-2021 [employer]. Care instructions adapted under license by [employer] (which disclaims liability or warranty for this information). If you have questions about a medical condition or this instruction, always ask your healthcare professional. [address] any warranty or liability for your use of this information.

## 2021-04-13 NOTE — PROGRESS NOTES
Diet as tolerated. High protein high kcal meal trays ordered. Pt meal compliant. Hx notable for substance abuse.     Ht: 5'7\"  Wt: 130 lb  BMI: 20.36 kg/(m^2) c/w low/normal weight  Est energy needs: 1715 kcal, 65 g protein, 1 mL/kcal fluids  Pt will consume > 75% of meals at follow up 7-10 days  LOS

## 2021-04-13 NOTE — BH NOTES
Patient alert and verbal. Discharge to home to continue recommended plan of care. Discharge instructions reviewed with patient. Patient verbalized understanding. Patient transported home via aunt.

## 2021-04-13 NOTE — BH NOTES
GROUP THERAPY PROGRESS NOTE    Patient is participating in Substance abuse/Coping Skills group. Group time: 45 minutes    Personal goal for participation: To understand triggers, ways to avoid triggers, and what to do when triggered. Goal orientation: Personal    Group therapy participation: minimal    Therapeutic interventions reviewed and discussed: Group discussion of substance use and triggers for relapse. Patients discussed changes that may be needed and ways to address each trigger in productive ways other than using or resorting to maladaptive behaviors. Each patient was able to discuss negative emotions they experience, places and situations that trigger the negative emotions, and time of the day when they tend to experience the greatest amount of triggers. Patients without substance abuse issues where able to look at triggers for negative emotions/mental health while those with substance abuse and mental health diagnoses were able to look at both. Patients also shared how they are feeling today with the group. Impression of participation: Rohith Villela was present but quiet in group today. She shared some of there triggers with the group but mostly sat and listened. When asked about how she was feeling today she reports that she was feeling good and waiting to see if she was going to go home today.     Mira Nettles [facility]

## 2021-04-13 NOTE — BH NOTES
GROUP THERAPY PROGRESS NOTE    Patient is participating in coping skills group. Group time: 45 minutes    Personal goal for participation: Learn coping skills to reduce negative emotions    Goal orientation: Personal    Group therapy participation: active    Therapeutic interventions reviewed and discussed: Group discussion on why being bored can be a problem and the consequences of boredom that patient have experienced. Patient discussed issues they have had with being bored and ways they have tried to combat boredom. This lead to discussion of coping skills for negative emotions and ways to feel better that each patient has tried or that they have heard of. Discussion of activities that help with boredom, challenges, potential solutions and plans. Impression of participation: Sandeep Bowen was present in group. She shared that she enjoys activities that fall into many different categories. She shared that she enjoys being outside and writing.     Mahesh Calderon LPC LSATP [facility] [place]

## 2021-04-13 NOTE — PROGRESS NOTES
Assumed care of patient after receiving shift report from outgoing nurse. Patient was out and visible on unit, interacting with peers and staff. Pt makes good eye contact. A&O x 4. Affect was dull, mood was anxious. Hygiene is adequate, independent in ADLs. Gait is steady. Appetite patterns WNL. Pt reports trouble falling asleep and staying asleep. Denies AVH/SI/HI/Depression. Endorses Anxiety rated as an 8/10. Last BM was 04/12/21. Pt reports chronic pain 10/10 in her back and right hip. Pt scored a 4 on CIWA due to anxiety. Pt encouraged to continue to participate in care. 2134: Tylenol, Zyprexa, and Trazodone administered for pain, agitation related to pain control, and sleep aid. 2230: Oxycodone administered for unresolved pain still a 10/10  Medication effective within the hour. Pt returned to room and is observed resting quietly. 3834: Pt reported 10/10 back pain. Tylenol administered    Pt has been observed throughout the night. Total hours slept approximately 6.75. No s/s of distress noted. Patient has remained safe.

## 2021-04-14 NOTE — DISCHARGE SUMMARY
PSYCHIATRIC DISCHARGE SUMMARY         IDENTIFICATION:    Patient Name  [facility]   Date of Birth 1975   [facility] [id number]   Medical Record Number  [id number]      Age  39 y.o. 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 ([facility] ICD-10-CM: F10.24  ICD-9-CM: 291.89, 303.90         Alcohol use disorder, severe, dependence ([facility] ICD-10-CM: F10.20  ICD-9-CM: 303.90         Chronic pain ICD-10-CM: G89.29  ICD-9-CM: 338.29               Active Hospital Problems    *Bipolar disorder, curr episode mixed, severe, w/o psychotic features ([facility]      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 WHITE female with a past psychiatric history significant for bipolar disorder and etoh use disorder, who presents at this time with complaints of (and/or evidence of) the following emotional symptoms: psychotic behavior and servando. Additional symptomatology include homicidal ideation. The above symptoms have been present for 2+ weeks. These symptoms are of moderate to high severity. These symptoms are constant in nature.   The patient's condition has been precipitated by psychosocial stressors. Patient's condition made worse by continued psychoactive drug use and alcohol use as well as treatment noncompliance. UDS: +THC; BAL=311 at admission.     Per documentation, patient has been acting more erratically at home, and recently threatening to kill her  by cutting his throat. She has been using combinations of medicinal marijuana and pain medication as well as up to a liter of alcohol daily.      The patient is an unreliable historian. The patient corroborates the above narrative. The patient contracts for safety on the unit and gives consent for the team to contact collateral. The patient is amenable to initiating treatment while on the unit. The patient reports that she is feeling physically debilitated and acknowledges alcohol withdrawal symptoms. Patient denies active thoughts of self harm. Education provided regarding the deleterious effects of combining psychoactive drugs with alcohol and painkillers, patient voiced understanding. 4/09 - patient slept 8 hours overnight, she reports high anxiety (\"10/10\") and is reporting severe and chronic pain, which she states is not helped by NSAIDs (allergy) or Tylenol (ineffective). Patient denies SI/HI/AVH, declines rehab placement. Per nursing, patient was served with a protective order yesterday and cannot return home upon discharge. SW working on a safe disposition plan. Patient irritable but cooperative with treatment thus far. No significant withdrawal symptoms noted, CIWA scores have been 0-6 and typically due to anxiety. Patient tolerating phenobarbital taper, no tremor or diaphoresis observed. 4/10/21 Janette reports feeling slightly better. However her anxiety remains \"high\". Says she is in [place] state of perpetual panic\" although she was quite relaxed and calm during the interview. Only used Vistaril twice yesterday and once today. I encouraged her to use it more often.  Denies any AH or VH. She reports being on Paxil 60mg prior to admission and would like to go back on this. Has not taken it in several days. I discussed with her the increased risk of mood lability but she would like to restart this. Restarted on 20mg daily. 4/11/21 Janette reports feeling slightly better today. Says she felt \"relaxed\" after taking Paxil. Her anxiety has reduced after taking Vistaril scheduled. SLept better last night. Denies any SI or plan. Denies any AH or VH. Enquiring about discharge home and I encouraged her to discuss this with her treatment team tomorrow. Pleasant and calm during the interview. 4/12 - no acute overnight events. Patient has been visible; she slept 4.5 hours overnight. Patient c/o pain this morning, getting Oxycodone for ongoing chronic pain. Discussed alternatives including steroids injections and Lyrica (patient presently on Lyrica). She denies SI/HI/AVH and is discharge focused; she wants to go to her sister's home upon discharge, though she is unable to get in touch with her. She declines rehab for alcohol use disorder and declines shelter placement stating she has no plans to do either. SW continuing to work on disposition plan with patient. SOCIAL HISTORY:    Social History     Socioeconomic History    Marital status: SINGLE     Spouse name: Not on file    Number of children: Not on file    Years of education: Not on file    Highest education level: Not on file   Occupational History    Not on file   Social Needs    Financial resource strain: Not on file    Food insecurity     Worry: Not on file     Inability: Not on file    Transportation needs     Medical: Not on file     Non-medical: Not on file   Tobacco Use    Smoking status: Current Every Day Smoker     Types: Cigarettes   Substance and Sexual Activity    Alcohol use:  Yes     Alcohol/week: 12.0 standard drinks     Types: 12 Shots of liquor per week     Comment: daily    Drug use: Not on file    Sexual activity: Not Currently   Lifestyle    Physical activity     Days per week: Not on file     Minutes per session: Not on file    Stress: Not on file   Relationships    Social connections     Talks on phone: Not on file     Gets together: Not on file     Attends Yazidi service: Not on file     Active member of club or organization: Not on file     Attends meetings of clubs or organizations: Not on file     Relationship status: Not on file    Intimate partner violence     Fear of current or ex partner: Not on file     Emotionally abused: Not on file     Physically abused: Not on file     Forced sexual activity: Not on file   Other Topics Concern     Service Not Asked    Blood Transfusions Not Asked    Caffeine Concern Not Asked    Occupational Exposure Not Asked   Bernett Catena Hazards Not Asked    Sleep Concern Not Asked    Stress Concern Not Asked    Weight Concern Not Asked    Special Diet Not Asked    Back Care Not Asked    Exercise Not Asked    Bike Helmet Not Asked   [address] Not Asked    Self-Exams Not Asked   Social History Narrative    Not on file      FAMILY HISTORY:   History reviewed. No pertinent family history. HOSPITALIZATION COURSE:    Oral Sung was admitted to the inpatient psychiatric unit [facility] for acute psychiatric stabilization in regards to symptomatology as described in the HPI above. The differential diagnosis at time of admission included: bipolar vs adjustment disorder. While on the unit Oral Sung was involved in individual, group, occupational and milieu therapy. Psychiatric medications were adjusted during this hospitalization including Geodon and Paxil. Oral Sung demonstrated a slow, but progressive improvement in overall condition. Much of patient's initial presentation appeared to be related to situational stressors, effects of medication non-compliance, alcohol abuse, and psychological factors.   Please see individual progress notes for more specific details regarding patient's hospitalization course. Patient advised to discontinue cannabis use due to increasingly unstable episodes at home and the negative sequelae of her decision making while intoxicated. She was also advised to limit her opiate use due to ongoing alcohol abuse. She would be a good candidate for naltrexone therapy if her opiate use was curtailed. Patient was offered and declined rehab placement. At time of discharge, Topher Amor is without significant problems of depression, psychosis, or servando. Patient free of suicidal and homicidal ideations (appears to be a chronic, elevated risk of suicide or homicide) but reports many positive predictive factors in terms of not attempting suicide or homicide. Overall presentation at time of discharge is most consistent with the diagnosis of bipolar disorder and etoh/cannabis induced psychosis. Patient has maximized benefit to be derived from acute inpatient psychiatric treatment. All members of the treatment team concur with each other in regards to plans for discharge today. Patient and family are aware and in agreement with discharge and discharge plan.          LABS AND IMAGAING:    Labs Reviewed   ETHYL ALCOHOL - Abnormal; Notable for the following components:       Result Value    ALCOHOL(ETHYL),SERUM 311 (*)     All other components within normal limits   DRUG SCREEN, URINE - Abnormal; Notable for the following components:    THC (TH-CANNABINOL) Positive (*)     All other components within normal limits   ETHYL ALCOHOL - Abnormal; Notable for the following components:    ALCOHOL(ETHYL),SERUM 110 (*)     All other components within normal limits   CBC W/O DIFF - Abnormal; Notable for the following components:    HGB 9.4 (*)     HCT 30.7 (*)     MCV 76.9 (*)     MCH 23.6 (*)     RDW 23.9 (*)     All other components within normal limits   METABOLIC PANEL, COMPREHENSIVE - Abnormal; Notable for the following components:    Potassium 3.2 (*)     Calcium 8.4 (*)     Albumin 3.2 (*)     A-G Ratio 0.8 (*)     All other components within normal limits   COVID-19 WITH INFLUENZA A/B   SAMPLES BEING HELD   TSH 3RD GENERATION   LIPID PANEL   HEMOGLOBIN A1C WITH EAG   GGT   HCG URINE, QL. - POC     No results found for: DS35, PHEN, PHENO, PHENT, DILF, DS39, PHENY, PTN, VALF2, VALAC, VALP, VALPR, DS6, CRBAM, CRBAMP, CARB2, XCRBAM  Admission on 04/07/2021, Discharged on 04/13/2021   Component Date Value Ref Range Status    SARS-CoV-2 04/07/2021 Not detected  NOTD   Final    Influenza A by PCR 04/07/2021 Not detected  NOTD   Final    Influenza B by PCR 04/07/2021 Not detected  NOTD   Final    ALCOHOL(ETHYL),SERUM 04/07/2021 311* <10 MG/DL Final    AMPHETAMINES 04/07/2021 Negative  NEG   Final    BARBITURATES 04/07/2021 Negative  NEG   Final    BENZODIAZEPINES 04/07/2021 Negative  NEG   Final    COCAINE 04/07/2021 Negative  NEG   Final    METHADONE 04/07/2021 Negative  NEG   Final    OPIATES 04/07/2021 Negative  NEG   Final    PCP(PHENCYCLIDINE) 04/07/2021 Negative  NEG   Final    THC (TH-CANNABINOL) 04/07/2021 Positive* NEG   Final    Drug screen comment 04/07/2021 (NOTE)   Final    SAMPLES BEING HELD 04/07/2021 SST,PST,BLUE,LAV   Final    COMMENT 04/07/2021 Add-on orders for these samples will be processed based on acceptable specimen integrity and analyte stability, which may vary by analyte.     Final    ALCOHOL(ETHYL),SERUM 04/07/2021 110* <10 MG/DL Final    Pregnancy test,urine (POC) 04/07/2021 Negative  NEG   Final    TSH 04/09/2021 0.66  0.36 - 3.74 uIU/mL Final    LIPID PROFILE 04/09/2021        Final    Cholesterol, total 04/09/2021 150  <200 MG/DL Final    Triglyceride 04/09/2021 71  <150 MG/DL Final    HDL Cholesterol 04/09/2021 80  MG/DL Final    LDL, calculated 04/09/2021 55.8  0 - 100 MG/DL Final    VLDL, calculated 04/09/2021 14.2  MG/DL Final    CHOL/HDL Ratio 04/09/2021 1.9 0.0 - 5.0   Final    Hemoglobin A1c 04/09/2021 4.7  4.0 - 5.6 % Final    Est. average glucose 04/09/2021 88  mg/dL Final    WBC 04/09/2021 4.3  3.6 - 11.0 K/uL Final    RBC 04/09/2021 3.99  3.80 - 5.20 M/uL Final    HGB 04/09/2021 9.4* 11.5 - 16.0 g/dL Final    HCT 04/09/2021 30.7* 35.0 - 47.0 % Final    MCV 04/09/2021 76.9* 80.0 - 99.0 FL Final    MCH 04/09/2021 23.6* 26.0 - 34.0 PG Final    MCHC 04/09/2021 30.6  30.0 - 36.5 g/dL Final    RDW 04/09/2021 23.9* 11.5 - 14.5 % Final    PLATELET 01/66/4705 551  150 - 400 K/uL Final    MPV 04/09/2021 9.3  8.9 - 12.9 FL Final    NRBC 04/09/2021 0.0  0  WBC Final    ABSOLUTE NRBC 04/09/2021 0.00  0.00 - 0.01 K/uL Final    GGT 04/09/2021 28  5 - 55 U/L Final    Sodium 04/09/2021 142  136 - 145 mmol/L Final    Potassium 04/09/2021 3.2* 3.5 - 5.1 mmol/L Final    Chloride 04/09/2021 104  97 - 108 mmol/L Final    CO2 04/09/2021 28  21 - 32 mmol/L Final    Anion gap 04/09/2021 10  5 - 15 mmol/L Final    Glucose 04/09/2021 89  65 - 100 mg/dL Final    BUN 04/09/2021 7  6 - 20 MG/DL Final    Creatinine 04/09/2021 0.59  0.55 - 1.02 MG/DL Final    BUN/Creatinine ratio 04/09/2021 12  12 - 20   Final    GFR est AA 04/09/2021 >60  >60 ml/min/1.73m2 Final    GFR est non-AA 04/09/2021 >60  >60 ml/min/1.73m2 Final    Calcium 04/09/2021 8.4* 8.5 - 10.1 MG/DL Final    Bilirubin, total 04/09/2021 0.4  0.2 - 1.0 MG/DL Final    ALT (SGPT) 04/09/2021 16  12 - 78 U/L Final    AST (SGOT) 04/09/2021 23  15 - 37 U/L Final    Alk. phosphatase 04/09/2021 65  45 - 117 U/L Final    Protein, total 04/09/2021 7.0  6.4 - 8.2 g/dL Final    Albumin 04/09/2021 3.2* 3.5 - 5.0 g/dL Final    Globulin 04/09/2021 3.8  2.0 - 4.0 g/dL Final    A-G Ratio 04/09/2021 0.8* 1.1 - 2.2   Final     No results found. DISPOSITION:    Family's house. Patient to f/u with drug/etoh rehabilitation, psychiatric, and psychotherapy appointments.  Patient is to f/u with internist as directed. FOLLOW-UP CARE:    Activity as tolerated  Regular diet  Wound Care: none needed. Follow-up Information     Follow up With Specialties Details Why [facility]  Call on 4/14/2021 Please call Rapid Access phone line [phone] between 8:00AM -2:00PM to complete intake assessment for ongoing mental health case management, therapy and medication management. [address] [place] [address]  Address: [address] [place] [address] Edil Sánchez   Phone: [phone]  [id number] at [phone] or [phone]    Other, MD Grace    Patient can only remember the practice name and not the physician                   PROGNOSIS:   Poor---- based on nature of patient's pathology/ies and treatment compliance issues. Prognosis is greatly dependent upon patient's ability to remain sober and to follow up with scheduled appointments as well as to comply with psychiatric medications as prescribed. DISCHARGE MEDICATIONS:    Informed consent given for the use of following psychotropic medications:  Discharge Medication List as of 4/13/2021  1:50 PM      START taking these medications    Details   hydrOXYzine HCL (ATARAX) 50 mg tablet Take 1 Tab by mouth three (3) times daily as needed for Anxiety for up to 60 days. Indications: anxious, Print, Disp-90 Tab, R-1      pregabalin (LYRICA) 75 mg capsule Take 1 Cap by mouth two (2) times a day. Max Daily Amount: 150 mg. Indications: Pain, Print, Disp-60 Cap, R-1      traZODone (DESYREL) 50 mg tablet Take 1 Tab by mouth nightly as needed for Sleep (For insomnia). Indications: insomnia associated with depression, Print, Disp-30 Tab, R-1         CONTINUE these medications which have CHANGED    Details   PARoxetine (PAXIL) 20 mg tablet Take 1 Tab by mouth daily.  Indications: bipolar depression, Print, Disp-30 Tab, R-1      ziprasidone (GEODON) 60 mg capsule Take 1 Cap by mouth two (2) times daily (with meals). Indications: bipolar depression, Print, Disp-60 Cap, R-1         CONTINUE these medications which have NOT CHANGED    Details   oxyCODONE IR (ROXICODONE) 5 mg immediate release tablet Take 7.5 mg by mouth every six (6) hours as needed for Pain., Historical Med      ferrous sulfate 325 mg (65 mg iron) tablet Take 650 mg by mouth Daily (before breakfast). , Historical Med      pantoprazole (PROTONIX) 40 mg tablet Take 40 mg by mouth two (2) times a day., Historical Med      albuterol (PROVENTIL HFA, VENTOLIN HFA, PROAIR HFA) 90 mcg/actuation inhaler Take 2 Puffs by inhalation every four (4) hours as needed for Wheezing or Shortness of Breath., Historical Med      sucralfate (CARAFATE) 1 gram tablet Take 1 g by mouth Before breakfast, lunch, dinner and at bedtime. , Historical Med         STOP taking these medications       metoprolol tartrate (LOPRESSOR) 100 mg IR tablet Comments:   Reason for Stopping:         zolpidem (Ambien) 10 mg tablet Comments:   Reason for Stopping:                      A coordinated, multidisplinary treatment team round was conducted with Jacinta Bob Phoenix---this is done daily here at [facility]. This team consists of the nurse, psychiatric unit pharmacist,  and Lay Granados. I have spent greater than 35 minutes on discharge work.     Signed:  Danielle Cooney MD  4/13/2021

## 2021-06-21 NOTE — PROGRESS NOTES
Pharmacy Note    Unclaimed patient's own medications were destroyed as described in policy 5.0.3: Patient's Own Medications.     The following medications were destroyed:   Nicotine lozenges 2mg    Thank you,  Natacha Obando CPHT

## 2022-03-18 PROBLEM — F10.24: Status: ACTIVE | Noted: 2021-04-08

## 2022-03-18 PROBLEM — G89.29 CHRONIC PAIN: Status: ACTIVE | Noted: 2021-04-08

## 2022-03-18 PROBLEM — F10.20 ALCOHOL USE DISORDER, SEVERE, DEPENDENCE (HCC): Status: ACTIVE | Noted: 2021-04-08

## 2022-03-18 PROBLEM — F31.63 BIPOLAR DISORDER, CURR EPISODE MIXED, SEVERE, W/O PSYCHOTIC FEATURES (HCC): Status: ACTIVE | Noted: 2021-04-08

## 2023-05-15 RX ORDER — ZIPRASIDONE HYDROCHLORIDE 60 MG/1
60 CAPSULE ORAL 2 TIMES DAILY WITH MEALS
COMMUNITY
Start: 2021-04-13

## 2023-05-15 RX ORDER — OXYCODONE HYDROCHLORIDE 5 MG/1
7.5 TABLET ORAL EVERY 6 HOURS PRN
COMMUNITY

## 2023-05-15 RX ORDER — FERROUS SULFATE 325(65) MG
650 TABLET ORAL
COMMUNITY

## 2023-05-15 RX ORDER — SUCRALFATE 1 G/1
1 TABLET ORAL
COMMUNITY

## 2023-05-15 RX ORDER — ALBUTEROL SULFATE 90 UG/1
2 AEROSOL, METERED RESPIRATORY (INHALATION) EVERY 4 HOURS PRN
COMMUNITY

## 2023-05-15 RX ORDER — PANTOPRAZOLE SODIUM 40 MG/1
40 TABLET, DELAYED RELEASE ORAL 2 TIMES DAILY
COMMUNITY

## 2023-05-15 RX ORDER — TRAZODONE HYDROCHLORIDE 50 MG/1
50 TABLET ORAL
COMMUNITY
Start: 2021-04-13

## 2023-05-15 RX ORDER — PAROXETINE HYDROCHLORIDE 20 MG/1
20 TABLET, FILM COATED ORAL DAILY
COMMUNITY
Start: 2021-04-14

## 2023-05-15 RX ORDER — PREGABALIN 75 MG/1
75 CAPSULE ORAL 2 TIMES DAILY
COMMUNITY
Start: 2021-04-13